# Patient Record
Sex: MALE | Race: WHITE | NOT HISPANIC OR LATINO | Employment: OTHER | URBAN - METROPOLITAN AREA
[De-identification: names, ages, dates, MRNs, and addresses within clinical notes are randomized per-mention and may not be internally consistent; named-entity substitution may affect disease eponyms.]

---

## 2017-04-10 ENCOUNTER — HOSPITAL ENCOUNTER (OUTPATIENT)
Dept: ULTRASOUND IMAGING | Facility: HOSPITAL | Age: 82
Discharge: HOME | End: 2017-04-10
Attending: FAMILY MEDICINE | Admitting: FAMILY MEDICINE

## 2018-11-15 ENCOUNTER — CONVERSION ENCOUNTER (OUTPATIENT)
Dept: CARDIOLOGY | Facility: CLINIC | Age: 83
End: 2018-11-15

## 2018-12-10 ENCOUNTER — CONVERSION ENCOUNTER (OUTPATIENT)
Dept: CARDIOLOGY | Facility: CLINIC | Age: 83
End: 2018-12-10

## 2019-01-01 ENCOUNTER — READMISSION MANAGEMENT (OUTPATIENT)
Dept: CALL CENTER | Facility: HOSPITAL | Age: 84
End: 2019-01-01

## 2019-01-01 ENCOUNTER — APPOINTMENT (OUTPATIENT)
Dept: CT IMAGING | Facility: HOSPITAL | Age: 84
End: 2019-01-01

## 2019-01-01 ENCOUNTER — OFFICE VISIT (OUTPATIENT)
Dept: CARDIOLOGY | Facility: CLINIC | Age: 84
End: 2019-01-01

## 2019-01-01 ENCOUNTER — APPOINTMENT (OUTPATIENT)
Dept: GENERAL RADIOLOGY | Facility: HOSPITAL | Age: 84
End: 2019-01-01

## 2019-01-01 ENCOUNTER — HOSPITAL ENCOUNTER (INPATIENT)
Facility: HOSPITAL | Age: 84
LOS: 3 days | Discharge: HOSPICE | End: 2019-10-25
Attending: EMERGENCY MEDICINE | Admitting: FAMILY MEDICINE

## 2019-01-01 ENCOUNTER — APPOINTMENT (OUTPATIENT)
Dept: MRI IMAGING | Facility: HOSPITAL | Age: 84
End: 2019-01-01

## 2019-01-01 VITALS
HEART RATE: 79 BPM | WEIGHT: 277.75 LBS | WEIGHT: 279 LBS | DIASTOLIC BLOOD PRESSURE: 92 MMHG | SYSTOLIC BLOOD PRESSURE: 142 MMHG | SYSTOLIC BLOOD PRESSURE: 152 MMHG | OXYGEN SATURATION: 95 % | DIASTOLIC BLOOD PRESSURE: 89 MMHG | HEART RATE: 65 BPM | OXYGEN SATURATION: 95 %

## 2019-01-01 VITALS
DIASTOLIC BLOOD PRESSURE: 72 MMHG | RESPIRATION RATE: 20 BRPM | HEART RATE: 63 BPM | BODY MASS INDEX: 34.24 KG/M2 | OXYGEN SATURATION: 95 % | WEIGHT: 275.35 LBS | TEMPERATURE: 97.6 F | SYSTOLIC BLOOD PRESSURE: 130 MMHG | HEIGHT: 75 IN

## 2019-01-01 VITALS
OXYGEN SATURATION: 95 % | SYSTOLIC BLOOD PRESSURE: 145 MMHG | DIASTOLIC BLOOD PRESSURE: 83 MMHG | BODY MASS INDEX: 35.42 KG/M2 | WEIGHT: 276 LBS | HEART RATE: 79 BPM | HEIGHT: 74 IN

## 2019-01-01 DIAGNOSIS — G93.5 NEOPLASM OF BRAIN CAUSING MASS EFFECT ON ADJACENT STRUCTURES (HCC): ICD-10-CM

## 2019-01-01 DIAGNOSIS — R41.3 MEMORY LOSS DUE TO MEDICAL CONDITION: Primary | ICD-10-CM

## 2019-01-01 DIAGNOSIS — D49.6 NEOPLASM OF BRAIN CAUSING MASS EFFECT ON ADJACENT STRUCTURES (HCC): ICD-10-CM

## 2019-01-01 DIAGNOSIS — R42 DIZZINESS: Primary | ICD-10-CM

## 2019-01-01 DIAGNOSIS — R55 NEAR SYNCOPE: ICD-10-CM

## 2019-01-01 LAB
ABO GROUP BLD: NORMAL
ALBUMIN SERPL-MCNC: 4.1 G/DL (ref 3.5–5.2)
ALBUMIN/GLOB SERPL: 2 G/DL
ALP SERPL-CCNC: 58 U/L (ref 39–117)
ALT SERPL W P-5'-P-CCNC: 17 U/L (ref 1–41)
ANION GAP SERPL CALCULATED.3IONS-SCNC: 11 MMOL/L (ref 5–15)
ANION GAP SERPL CALCULATED.3IONS-SCNC: 13 MMOL/L (ref 5–15)
ANION GAP SERPL CALCULATED.3IONS-SCNC: 13 MMOL/L (ref 5–15)
APTT PPP: 20.9 SECONDS (ref 24–31)
AST SERPL-CCNC: 17 U/L (ref 1–40)
BACTERIA SPEC AEROBE CULT: NORMAL
BACTERIA SPEC AEROBE CULT: NORMAL
BASOPHILS # BLD AUTO: 0 10*3/MM3 (ref 0–0.2)
BASOPHILS # BLD AUTO: 0.1 10*3/MM3 (ref 0–0.2)
BASOPHILS NFR BLD AUTO: 0 % (ref 0–1.5)
BASOPHILS NFR BLD AUTO: 0.1 % (ref 0–1.5)
BASOPHILS NFR BLD AUTO: 0.2 % (ref 0–1.5)
BASOPHILS NFR BLD AUTO: 1 % (ref 0–1.5)
BILIRUB SERPL-MCNC: 0.4 MG/DL (ref 0.2–1.2)
BILIRUB UR QL STRIP: NEGATIVE
BLD GP AB SCN SERPL QL: NEGATIVE
BUN BLD-MCNC: 15 MG/DL (ref 8–23)
BUN BLD-MCNC: 19 MG/DL (ref 8–23)
BUN BLD-MCNC: 21 MG/DL (ref 8–23)
BUN/CREAT SERPL: 20.8 (ref 7–25)
BUN/CREAT SERPL: 26.4 (ref 7–25)
BUN/CREAT SERPL: 28.4 (ref 7–25)
CALCIUM SPEC-SCNC: 8.7 MG/DL (ref 8.6–10.5)
CALCIUM SPEC-SCNC: 9.4 MG/DL (ref 8.6–10.5)
CALCIUM SPEC-SCNC: 9.6 MG/DL (ref 8.6–10.5)
CHLORIDE SERPL-SCNC: 104 MMOL/L (ref 98–107)
CHLORIDE SERPL-SCNC: 105 MMOL/L (ref 98–107)
CHLORIDE SERPL-SCNC: 107 MMOL/L (ref 98–107)
CHOLEST SERPL-MCNC: 81 MG/DL (ref 0–200)
CLARITY UR: CLEAR
CO2 SERPL-SCNC: 24 MMOL/L (ref 22–29)
CO2 SERPL-SCNC: 25 MMOL/L (ref 22–29)
CO2 SERPL-SCNC: 27 MMOL/L (ref 22–29)
COLOR UR: YELLOW
CREAT BLD-MCNC: 0.72 MG/DL (ref 0.76–1.27)
CREAT BLD-MCNC: 0.72 MG/DL (ref 0.76–1.27)
CREAT BLD-MCNC: 0.74 MG/DL (ref 0.76–1.27)
D-LACTATE SERPL-SCNC: 3.3 MMOL/L (ref 0.5–2)
D-LACTATE SERPL-SCNC: 3.8 MMOL/L (ref 0.5–2)
DEPRECATED RDW RBC AUTO: 45.9 FL (ref 37–54)
DEPRECATED RDW RBC AUTO: 45.9 FL (ref 37–54)
DEPRECATED RDW RBC AUTO: 46.4 FL (ref 37–54)
DEPRECATED RDW RBC AUTO: 46.4 FL (ref 37–54)
EOSINOPHIL # BLD AUTO: 0 10*3/MM3 (ref 0–0.4)
EOSINOPHIL # BLD AUTO: 0.2 10*3/MM3 (ref 0–0.4)
EOSINOPHIL NFR BLD AUTO: 0 % (ref 0.3–6.2)
EOSINOPHIL NFR BLD AUTO: 0 % (ref 0.3–6.2)
EOSINOPHIL NFR BLD AUTO: 0.1 % (ref 0.3–6.2)
EOSINOPHIL NFR BLD AUTO: 1.9 % (ref 0.3–6.2)
ERYTHROCYTE [DISTWIDTH] IN BLOOD BY AUTOMATED COUNT: 13.5 % (ref 12.3–15.4)
ERYTHROCYTE [DISTWIDTH] IN BLOOD BY AUTOMATED COUNT: 13.6 % (ref 12.3–15.4)
ERYTHROCYTE [DISTWIDTH] IN BLOOD BY AUTOMATED COUNT: 13.7 % (ref 12.3–15.4)
ERYTHROCYTE [DISTWIDTH] IN BLOOD BY AUTOMATED COUNT: 13.7 % (ref 12.3–15.4)
GFR SERPL CREATININE-BSD FRML MDRD: 101 ML/MIN/1.73
GFR SERPL CREATININE-BSD FRML MDRD: 104 ML/MIN/1.73
GFR SERPL CREATININE-BSD FRML MDRD: 104 ML/MIN/1.73
GLOBULIN UR ELPH-MCNC: 2.1 GM/DL
GLUCOSE BLD-MCNC: 136 MG/DL (ref 65–99)
GLUCOSE BLD-MCNC: 155 MG/DL (ref 65–99)
GLUCOSE BLD-MCNC: 159 MG/DL (ref 65–99)
GLUCOSE BLDC GLUCOMTR-MCNC: 111 MG/DL (ref 70–105)
GLUCOSE BLDC GLUCOMTR-MCNC: 124 MG/DL (ref 70–105)
GLUCOSE BLDC GLUCOMTR-MCNC: 132 MG/DL (ref 70–105)
GLUCOSE BLDC GLUCOMTR-MCNC: 135 MG/DL (ref 70–105)
GLUCOSE BLDC GLUCOMTR-MCNC: 136 MG/DL (ref 70–105)
GLUCOSE BLDC GLUCOMTR-MCNC: 137 MG/DL (ref 70–105)
GLUCOSE BLDC GLUCOMTR-MCNC: 138 MG/DL (ref 70–105)
GLUCOSE BLDC GLUCOMTR-MCNC: 150 MG/DL (ref 70–105)
GLUCOSE BLDC GLUCOMTR-MCNC: 153 MG/DL (ref 70–105)
GLUCOSE BLDC GLUCOMTR-MCNC: 154 MG/DL (ref 70–105)
GLUCOSE BLDC GLUCOMTR-MCNC: 188 MG/DL (ref 70–105)
GLUCOSE BLDC GLUCOMTR-MCNC: 191 MG/DL (ref 70–105)
GLUCOSE UR STRIP-MCNC: NEGATIVE MG/DL
HBA1C MFR BLD: 5.3 % (ref 3.5–5.6)
HCT VFR BLD AUTO: 39.5 % (ref 37.5–51)
HCT VFR BLD AUTO: 40.4 % (ref 37.5–51)
HCT VFR BLD AUTO: 41.8 % (ref 37.5–51)
HCT VFR BLD AUTO: 42.9 % (ref 37.5–51)
HDLC SERPL-MCNC: 24 MG/DL (ref 40–60)
HGB BLD-MCNC: 13.3 G/DL (ref 13–17.7)
HGB BLD-MCNC: 14 G/DL (ref 13–17.7)
HGB BLD-MCNC: 14.1 G/DL (ref 13–17.7)
HGB BLD-MCNC: 14.7 G/DL (ref 13–17.7)
HGB UR QL STRIP.AUTO: NEGATIVE
HOLD SPECIMEN: NORMAL
INR PPP: 1.02 (ref 0.9–1.1)
KETONES UR QL STRIP: ABNORMAL
LDLC SERPL CALC-MCNC: 25 MG/DL (ref 0–100)
LDLC/HDLC SERPL: 1.06 {RATIO}
LEUKOCYTE ESTERASE UR QL STRIP.AUTO: NEGATIVE
LYMPHOCYTES # BLD AUTO: 1 10*3/MM3 (ref 0.7–3.1)
LYMPHOCYTES # BLD AUTO: 1.1 10*3/MM3 (ref 0.7–3.1)
LYMPHOCYTES # BLD AUTO: 1.1 10*3/MM3 (ref 0.7–3.1)
LYMPHOCYTES # BLD AUTO: 1.7 10*3/MM3 (ref 0.7–3.1)
LYMPHOCYTES NFR BLD AUTO: 14.1 % (ref 19.6–45.3)
LYMPHOCYTES NFR BLD AUTO: 21.8 % (ref 19.6–45.3)
LYMPHOCYTES NFR BLD AUTO: 7.1 % (ref 19.6–45.3)
LYMPHOCYTES NFR BLD AUTO: 7.3 % (ref 19.6–45.3)
MAGNESIUM SERPL-MCNC: 1.9 MG/DL (ref 1.6–2.4)
MAGNESIUM SERPL-MCNC: 1.9 MG/DL (ref 1.6–2.4)
MAGNESIUM SERPL-MCNC: 2.1 MG/DL (ref 1.6–2.4)
MCH RBC QN AUTO: 32.6 PG (ref 26.6–33)
MCH RBC QN AUTO: 32.7 PG (ref 26.6–33)
MCH RBC QN AUTO: 33.1 PG (ref 26.6–33)
MCH RBC QN AUTO: 33.4 PG (ref 26.6–33)
MCHC RBC AUTO-ENTMCNC: 33.7 G/DL (ref 31.5–35.7)
MCHC RBC AUTO-ENTMCNC: 33.8 G/DL (ref 31.5–35.7)
MCHC RBC AUTO-ENTMCNC: 34.2 G/DL (ref 31.5–35.7)
MCHC RBC AUTO-ENTMCNC: 34.7 G/DL (ref 31.5–35.7)
MCV RBC AUTO: 96.2 FL (ref 79–97)
MCV RBC AUTO: 96.9 FL (ref 79–97)
MCV RBC AUTO: 97 FL (ref 79–97)
MCV RBC AUTO: 97 FL (ref 79–97)
MONOCYTES # BLD AUTO: 0.1 10*3/MM3 (ref 0.1–0.9)
MONOCYTES # BLD AUTO: 0.4 10*3/MM3 (ref 0.1–0.9)
MONOCYTES # BLD AUTO: 0.5 10*3/MM3 (ref 0.1–0.9)
MONOCYTES # BLD AUTO: 0.6 10*3/MM3 (ref 0.1–0.9)
MONOCYTES NFR BLD AUTO: 1 % (ref 5–12)
MONOCYTES NFR BLD AUTO: 2.6 % (ref 5–12)
MONOCYTES NFR BLD AUTO: 3.1 % (ref 5–12)
MONOCYTES NFR BLD AUTO: 7.9 % (ref 5–12)
MRSA SPEC QL CULT: NORMAL
NEUTROPHILS # BLD AUTO: 12.8 10*3/MM3 (ref 1.7–7)
NEUTROPHILS # BLD AUTO: 13.8 10*3/MM3 (ref 1.7–7)
NEUTROPHILS # BLD AUTO: 5.4 10*3/MM3 (ref 1.7–7)
NEUTROPHILS # BLD AUTO: 6.3 10*3/MM3 (ref 1.7–7)
NEUTROPHILS NFR BLD AUTO: 67.4 % (ref 42.7–76)
NEUTROPHILS NFR BLD AUTO: 84.6 % (ref 42.7–76)
NEUTROPHILS NFR BLD AUTO: 89.7 % (ref 42.7–76)
NEUTROPHILS NFR BLD AUTO: 90.1 % (ref 42.7–76)
NITRITE UR QL STRIP: NEGATIVE
NRBC BLD AUTO-RTO: 0 /100 WBC (ref 0–0.2)
NRBC BLD AUTO-RTO: 0 /100 WBC (ref 0–0.2)
NRBC BLD AUTO-RTO: 0.1 /100 WBC (ref 0–0.2)
NRBC BLD AUTO-RTO: 0.1 /100 WBC (ref 0–0.2)
PH UR STRIP.AUTO: 6 [PH] (ref 5–8)
PLATELET # BLD AUTO: 187 10*3/MM3 (ref 140–450)
PLATELET # BLD AUTO: 210 10*3/MM3 (ref 140–450)
PLATELET # BLD AUTO: 212 10*3/MM3 (ref 140–450)
PLATELET # BLD AUTO: 215 10*3/MM3 (ref 140–450)
PMV BLD AUTO: 8.6 FL (ref 6–12)
PMV BLD AUTO: 8.7 FL (ref 6–12)
PMV BLD AUTO: 9.5 FL (ref 6–12)
PMV BLD AUTO: 9.9 FL (ref 6–12)
POTASSIUM BLD-SCNC: 3.8 MMOL/L (ref 3.5–5.2)
POTASSIUM BLD-SCNC: 4 MMOL/L (ref 3.5–5.2)
POTASSIUM BLD-SCNC: 4.1 MMOL/L (ref 3.5–5.2)
PROT SERPL-MCNC: 6.2 G/DL (ref 6–8.5)
PROT UR QL STRIP: NEGATIVE
PROTHROMBIN TIME: 10.6 SECONDS (ref 9.6–11.7)
RBC # BLD AUTO: 4.08 10*6/MM3 (ref 4.14–5.8)
RBC # BLD AUTO: 4.2 10*6/MM3 (ref 4.14–5.8)
RBC # BLD AUTO: 4.3 10*6/MM3 (ref 4.14–5.8)
RBC # BLD AUTO: 4.43 10*6/MM3 (ref 4.14–5.8)
RH BLD: POSITIVE
SODIUM BLD-SCNC: 141 MMOL/L (ref 136–145)
SODIUM BLD-SCNC: 143 MMOL/L (ref 136–145)
SODIUM BLD-SCNC: 145 MMOL/L (ref 136–145)
SP GR UR STRIP: 1.03 (ref 1–1.03)
T&S EXPIRATION DATE: NORMAL
TRIGL SERPL-MCNC: 158 MG/DL (ref 0–150)
TROPONIN T SERPL-MCNC: <0.01 NG/ML (ref 0–0.03)
TSH SERPL DL<=0.05 MIU/L-ACNC: 3.12 UIU/ML (ref 0.27–4.2)
UROBILINOGEN UR QL STRIP: ABNORMAL
VIT B12 BLD-MCNC: 180 PG/ML (ref 211–946)
VLDLC SERPL-MCNC: 31.6 MG/DL
WBC NRBC COR # BLD: 14.2 10*3/MM3 (ref 3.4–10.8)
WBC NRBC COR # BLD: 15.4 10*3/MM3 (ref 3.4–10.8)
WBC NRBC COR # BLD: 7.5 10*3/MM3 (ref 3.4–10.8)
WBC NRBC COR # BLD: 8 10*3/MM3 (ref 3.4–10.8)
WHOLE BLOOD HOLD SPECIMEN: NORMAL
WHOLE BLOOD HOLD SPECIMEN: NORMAL

## 2019-01-01 PROCEDURE — 84484 ASSAY OF TROPONIN QUANT: CPT | Performed by: NURSE PRACTITIONER

## 2019-01-01 PROCEDURE — 83735 ASSAY OF MAGNESIUM: CPT | Performed by: NURSE PRACTITIONER

## 2019-01-01 PROCEDURE — 85730 THROMBOPLASTIN TIME PARTIAL: CPT | Performed by: EMERGENCY MEDICINE

## 2019-01-01 PROCEDURE — 25010000002 GADOTERIDOL PER 1 ML: Performed by: INTERNAL MEDICINE

## 2019-01-01 PROCEDURE — 87040 BLOOD CULTURE FOR BACTERIA: CPT | Performed by: NURSE PRACTITIONER

## 2019-01-01 PROCEDURE — 93005 ELECTROCARDIOGRAM TRACING: CPT | Performed by: EMERGENCY MEDICINE

## 2019-01-01 PROCEDURE — 99213 OFFICE O/P EST LOW 20 MIN: CPT | Performed by: INTERNAL MEDICINE

## 2019-01-01 PROCEDURE — 0 IOPAMIDOL PER 1 ML: Performed by: INTERNAL MEDICINE

## 2019-01-01 PROCEDURE — 83036 HEMOGLOBIN GLYCOSYLATED A1C: CPT | Performed by: NURSE PRACTITIONER

## 2019-01-01 PROCEDURE — 85025 COMPLETE CBC W/AUTO DIFF WBC: CPT | Performed by: EMERGENCY MEDICINE

## 2019-01-01 PROCEDURE — 82607 VITAMIN B-12: CPT | Performed by: NURSE PRACTITIONER

## 2019-01-01 PROCEDURE — 82962 GLUCOSE BLOOD TEST: CPT

## 2019-01-01 PROCEDURE — 63710000001 INSULIN LISPRO (HUMAN) PER 5 UNITS: Performed by: NURSE PRACTITIONER

## 2019-01-01 PROCEDURE — 85610 PROTHROMBIN TIME: CPT | Performed by: EMERGENCY MEDICINE

## 2019-01-01 PROCEDURE — 86901 BLOOD TYPING SEROLOGIC RH(D): CPT | Performed by: EMERGENCY MEDICINE

## 2019-01-01 PROCEDURE — 84443 ASSAY THYROID STIM HORMONE: CPT | Performed by: NURSE PRACTITIONER

## 2019-01-01 PROCEDURE — 71260 CT THORAX DX C+: CPT

## 2019-01-01 PROCEDURE — 80048 BASIC METABOLIC PNL TOTAL CA: CPT | Performed by: NURSE PRACTITIONER

## 2019-01-01 PROCEDURE — 86850 RBC ANTIBODY SCREEN: CPT | Performed by: EMERGENCY MEDICINE

## 2019-01-01 PROCEDURE — 93000 ELECTROCARDIOGRAM COMPLETE: CPT | Performed by: INTERNAL MEDICINE

## 2019-01-01 PROCEDURE — 25010000002 LEVETIRACETAM IN NACL 0.82% 500 MG/100ML SOLUTION: Performed by: NURSE PRACTITIONER

## 2019-01-01 PROCEDURE — 71045 X-RAY EXAM CHEST 1 VIEW: CPT

## 2019-01-01 PROCEDURE — 85025 COMPLETE CBC W/AUTO DIFF WBC: CPT | Performed by: NURSE PRACTITIONER

## 2019-01-01 PROCEDURE — 70450 CT HEAD/BRAIN W/O DYE: CPT

## 2019-01-01 PROCEDURE — 99231 SBSQ HOSP IP/OBS SF/LOW 25: CPT | Performed by: INTERNAL MEDICINE

## 2019-01-01 PROCEDURE — 25010000003 LEVETIRACETAM IN NACL 0.75% 1000 MG/100ML SOLUTION: Performed by: EMERGENCY MEDICINE

## 2019-01-01 PROCEDURE — 81003 URINALYSIS AUTO W/O SCOPE: CPT | Performed by: PHYSICIAN ASSISTANT

## 2019-01-01 PROCEDURE — 80053 COMPREHEN METABOLIC PANEL: CPT | Performed by: EMERGENCY MEDICINE

## 2019-01-01 PROCEDURE — 80061 LIPID PANEL: CPT | Performed by: NURSE PRACTITIONER

## 2019-01-01 PROCEDURE — 99231 SBSQ HOSP IP/OBS SF/LOW 25: CPT | Performed by: PHYSICIAN ASSISTANT

## 2019-01-01 PROCEDURE — 86900 BLOOD TYPING SEROLOGIC ABO: CPT | Performed by: EMERGENCY MEDICINE

## 2019-01-01 PROCEDURE — 99221 1ST HOSP IP/OBS SF/LOW 40: CPT | Performed by: PHYSICIAN ASSISTANT

## 2019-01-01 PROCEDURE — 25010000002 DEXAMETHASONE SODIUM PHOSPHATE 10 MG/ML SOLUTION: Performed by: NURSE PRACTITIONER

## 2019-01-01 PROCEDURE — 99232 SBSQ HOSP IP/OBS MODERATE 35: CPT | Performed by: NURSE PRACTITIONER

## 2019-01-01 PROCEDURE — 99222 1ST HOSP IP/OBS MODERATE 55: CPT | Performed by: INTERNAL MEDICINE

## 2019-01-01 PROCEDURE — 84484 ASSAY OF TROPONIN QUANT: CPT | Performed by: EMERGENCY MEDICINE

## 2019-01-01 PROCEDURE — 99284 EMERGENCY DEPT VISIT MOD MDM: CPT

## 2019-01-01 PROCEDURE — 83605 ASSAY OF LACTIC ACID: CPT | Performed by: NURSE PRACTITIONER

## 2019-01-01 PROCEDURE — 99232 SBSQ HOSP IP/OBS MODERATE 35: CPT | Performed by: INTERNAL MEDICINE

## 2019-01-01 PROCEDURE — 25010000002 DEXAMETHASONE SODIUM PHOSPHATE 20 MG/5ML SOLUTION 5 ML VIAL: Performed by: EMERGENCY MEDICINE

## 2019-01-01 PROCEDURE — 87081 CULTURE SCREEN ONLY: CPT | Performed by: NURSE PRACTITIONER

## 2019-01-01 PROCEDURE — A9576 INJ PROHANCE MULTIPACK: HCPCS | Performed by: INTERNAL MEDICINE

## 2019-01-01 PROCEDURE — 99233 SBSQ HOSP IP/OBS HIGH 50: CPT | Performed by: NURSE PRACTITIONER

## 2019-01-01 PROCEDURE — 86900 BLOOD TYPING SEROLOGIC ABO: CPT

## 2019-01-01 PROCEDURE — 86901 BLOOD TYPING SEROLOGIC RH(D): CPT

## 2019-01-01 PROCEDURE — 70553 MRI BRAIN STEM W/O & W/DYE: CPT

## 2019-01-01 PROCEDURE — 74177 CT ABD & PELVIS W/CONTRAST: CPT

## 2019-01-01 PROCEDURE — 99222 1ST HOSP IP/OBS MODERATE 55: CPT | Performed by: NURSE PRACTITIONER

## 2019-01-01 RX ORDER — TERBINAFINE HYDROCHLORIDE 250 MG/1
TABLET ORAL
COMMUNITY
Start: 2019-01-01 | End: 2019-01-01

## 2019-01-01 RX ORDER — NICOTINE POLACRILEX 4 MG
15 LOZENGE BUCCAL
Status: DISCONTINUED | OUTPATIENT
Start: 2019-01-01 | End: 2019-01-01 | Stop reason: HOSPADM

## 2019-01-01 RX ORDER — PRAVASTATIN SODIUM 20 MG
20 TABLET ORAL DAILY
COMMUNITY
Start: 2019-01-01 | End: 2019-01-01 | Stop reason: HOSPADM

## 2019-01-01 RX ORDER — SODIUM CHLORIDE 0.9 % (FLUSH) 0.9 %
10 SYRINGE (ML) INJECTION EVERY 12 HOURS SCHEDULED
Status: DISCONTINUED | OUTPATIENT
Start: 2019-01-01 | End: 2019-01-01 | Stop reason: HOSPADM

## 2019-01-01 RX ORDER — SODIUM CHLORIDE 0.9 % (FLUSH) 0.9 %
10 SYRINGE (ML) INJECTION AS NEEDED
Status: DISCONTINUED | OUTPATIENT
Start: 2019-01-01 | End: 2019-01-01 | Stop reason: HOSPADM

## 2019-01-01 RX ORDER — FAMOTIDINE 10 MG/ML
20 INJECTION, SOLUTION INTRAVENOUS EVERY 12 HOURS SCHEDULED
Status: DISCONTINUED | OUTPATIENT
Start: 2019-01-01 | End: 2019-01-01

## 2019-01-01 RX ORDER — LEVETIRACETAM 5 MG/ML
500 INJECTION INTRAVASCULAR EVERY 12 HOURS
Status: DISCONTINUED | OUTPATIENT
Start: 2019-01-01 | End: 2019-01-01 | Stop reason: HOSPADM

## 2019-01-01 RX ORDER — DEXAMETHASONE SODIUM PHOSPHATE 10 MG/ML
8 INJECTION, SOLUTION INTRAMUSCULAR; INTRAVENOUS EVERY 12 HOURS
Status: DISCONTINUED | OUTPATIENT
Start: 2019-01-01 | End: 2019-01-01 | Stop reason: HOSPADM

## 2019-01-01 RX ORDER — ATENOLOL 25 MG/1
25 TABLET ORAL DAILY
COMMUNITY
Start: 2019-01-01 | End: 2019-01-01 | Stop reason: HOSPADM

## 2019-01-01 RX ORDER — ASPIRIN 81 MG/1
81 TABLET ORAL DAILY
COMMUNITY
Start: 2018-12-10 | End: 2019-01-01 | Stop reason: HOSPADM

## 2019-01-01 RX ORDER — SERTRALINE HYDROCHLORIDE 25 MG/1
25 TABLET, FILM COATED ORAL DAILY
COMMUNITY
Start: 2019-01-01 | End: 2019-01-01 | Stop reason: HOSPADM

## 2019-01-01 RX ORDER — DEXTROSE MONOHYDRATE 25 G/50ML
25 INJECTION, SOLUTION INTRAVENOUS
Status: DISCONTINUED | OUTPATIENT
Start: 2019-01-01 | End: 2019-01-01 | Stop reason: HOSPADM

## 2019-01-01 RX ORDER — DEXAMETHASONE 4 MG/1
4 TABLET ORAL 2 TIMES DAILY WITH MEALS
Qty: 60 TABLET | Refills: 2 | Status: SHIPPED | OUTPATIENT
Start: 2019-01-01

## 2019-01-01 RX ORDER — LEVETIRACETAM 500 MG/1
500 TABLET ORAL 2 TIMES DAILY
Qty: 60 TABLET | Refills: 2 | Status: SHIPPED | OUTPATIENT
Start: 2019-01-01

## 2019-01-01 RX ORDER — LEVETIRACETAM 10 MG/ML
1000 INJECTION INTRAVASCULAR ONCE
Status: COMPLETED | OUTPATIENT
Start: 2019-01-01 | End: 2019-01-01

## 2019-01-01 RX ADMIN — Medication 10 ML: at 21:35

## 2019-01-01 RX ADMIN — LEVETIRACETAM 1000 MG: 10 INJECTION INTRAVENOUS at 00:50

## 2019-01-01 RX ADMIN — LEVETIRACETAM 500 MG: 5 INJECTION INTRAVENOUS at 01:52

## 2019-01-01 RX ADMIN — FAMOTIDINE 20 MG: 10 INJECTION, SOLUTION INTRAVENOUS at 23:36

## 2019-01-01 RX ADMIN — DEXAMETHASONE SODIUM PHOSPHATE 8 MG: 10 INJECTION, SOLUTION INTRAMUSCULAR; INTRAVENOUS at 09:00

## 2019-01-01 RX ADMIN — Medication 10 ML: at 20:31

## 2019-01-01 RX ADMIN — DEXAMETHASONE SODIUM PHOSPHATE 8 MG: 10 INJECTION, SOLUTION INTRAMUSCULAR; INTRAVENOUS at 09:35

## 2019-01-01 RX ADMIN — LEVETIRACETAM 500 MG: 5 INJECTION INTRAVENOUS at 23:44

## 2019-01-01 RX ADMIN — GADOTERIDOL 20 ML: 279.3 INJECTION, SOLUTION INTRAVENOUS at 14:00

## 2019-01-01 RX ADMIN — FAMOTIDINE 20 MG: 10 INJECTION, SOLUTION INTRAVENOUS at 08:28

## 2019-01-01 RX ADMIN — DEXAMETHASONE SODIUM PHOSPHATE 10 MG: 4 INJECTION, SOLUTION INTRAMUSCULAR; INTRAVENOUS at 21:25

## 2019-01-01 RX ADMIN — FAMOTIDINE 20 MG: 10 INJECTION, SOLUTION INTRAVENOUS at 20:30

## 2019-01-01 RX ADMIN — LEVETIRACETAM 500 MG: 5 INJECTION INTRAVENOUS at 13:55

## 2019-01-01 RX ADMIN — DEXAMETHASONE SODIUM PHOSPHATE 8 MG: 10 INJECTION, SOLUTION INTRAMUSCULAR; INTRAVENOUS at 21:35

## 2019-01-01 RX ADMIN — IOPAMIDOL 100 ML: 755 INJECTION, SOLUTION INTRAVENOUS at 12:45

## 2019-01-01 RX ADMIN — DEXAMETHASONE SODIUM PHOSPHATE 8 MG: 10 INJECTION, SOLUTION INTRAMUSCULAR; INTRAVENOUS at 20:30

## 2019-01-01 RX ADMIN — LEVETIRACETAM 500 MG: 5 INJECTION INTRAVENOUS at 11:59

## 2019-01-01 RX ADMIN — INSULIN LISPRO 2 UNITS: 100 INJECTION, SOLUTION INTRAVENOUS; SUBCUTANEOUS at 20:29

## 2019-01-01 RX ADMIN — Medication 10 ML: at 09:01

## 2019-01-01 RX ADMIN — Medication 10 ML: at 08:28

## 2019-01-01 RX ADMIN — FAMOTIDINE 20 MG: 10 INJECTION, SOLUTION INTRAVENOUS at 09:00

## 2019-01-01 RX ADMIN — INSULIN LISPRO 2 UNITS: 100 INJECTION, SOLUTION INTRAVENOUS; SUBCUTANEOUS at 23:44

## 2019-01-01 RX ADMIN — Medication 10 ML: at 23:36

## 2019-07-11 PROBLEM — R55 NEAR SYNCOPE: Status: ACTIVE | Noted: 2018-11-15

## 2019-07-11 PROBLEM — R42 DIZZINESS: Status: ACTIVE | Noted: 2018-11-15

## 2019-07-11 NOTE — PROGRESS NOTES
Encounter Date:07/11/2019  Last seen 12/10/2018      Patient ID: Bruce Manzo is a 84 y.o. male.    Chief Complaint:  Follow-up dizziness near syncope      History of Present Illness    Since I have last seen, the patient has been without any chest discomfort ,shortness of breath, palpitations, dizziness or syncope.  Denies having any headache ,abdominal pain ,nausea, vomiting , diarrhea constipation, loss of weight or loss of appetite.  Denies having any excessive bruising ,hematuria or blood in the stool.    Review of all systems negative except as indicated    Assessment and Plan       ////////////////////////    Impression  ===========  -recent dizziness and near syncopal  improved with better control of blood pressure on atenolol.     -chest pain atypical. Improved. Normal coronary arteries and normal left ventricle function 03/30/2015    - history of possible old stroke -CT scan and MRI       -tortuous right common iliac artery     -history of prostate carcinoma.  Patient is undergoing radiation therapy.  ============    Plan  ==========    Dizziness and blood pressure has improved with atenolol  Continue atenolol.  Medications were reviewed and updated.   patient is not having any angina pectoris or congestive heart failure.  Followup in the office in 6 months.  //////////////////////////////             Diagnosis Plan   1. Dizziness  ECG 12 Lead   2. Near syncope  ECG 12 Lead   LAB RESULTS (LAST 7 DAYS)    CBC        BMP        CMP         BNP        TROPONIN        CoAg        Creatinine Clearance  CrCl cannot be calculated (Patient's most recent lab result is older than the maximum 30 days allowed.).    ABG        Radiology  No radiology results for the last day                The following portions of the patient's history were reviewed and updated as appropriate: allergies, current medications, past family history, past medical history, past social history, past surgical history and problem  list.    Review of Systems   Constitution: Negative for malaise/fatigue.   Cardiovascular: Negative for chest pain, leg swelling, palpitations and syncope.   Respiratory: Negative for shortness of breath.    Skin: Negative for rash.   Gastrointestinal: Negative for nausea and vomiting.   Neurological: Negative for dizziness, light-headedness and numbness.         Current Outpatient Medications:   •  aspirin (ASPIR-LOW) 81 MG EC tablet, Daily., Disp: , Rfl:   •  atenolol (TENORMIN) 25 MG tablet, , Disp: , Rfl:   •  pravastatin (PRAVACHOL) 20 MG tablet, , Disp: , Rfl:   •  sertraline (ZOLOFT) 25 MG tablet, , Disp: , Rfl:   •  terbinafine (lamiSIL) 250 MG tablet, , Disp: , Rfl:     No Known Allergies    Family History   Problem Relation Age of Onset   • Colon cancer Mother    • Stroke Father    • Lung cancer Brother        Past Surgical History:   Procedure Laterality Date   • CARDIAC CATHETERIZATION  03/30/2015   • SKIN CANCER EXCISION      Neck        Past Medical History:   Diagnosis Date   • No known drug allergy    • Prostate cancer (CMS/HCC)     Currently in radiation therapy        Family History   Problem Relation Age of Onset   • Colon cancer Mother    • Stroke Father    • Lung cancer Brother        Social History     Socioeconomic History   • Marital status:      Spouse name: Not on file   • Number of children: Not on file   • Years of education: Not on file   • Highest education level: Not on file   Tobacco Use   • Smoking status: Never Smoker   Substance and Sexual Activity   • Alcohol use: No     Frequency: Never   • Drug use: No           ECG 12 Lead  Date/Time: 7/11/2019 10:23 AM  Performed by: Glendy Melgar MD  Authorized by: Glendy Melgar MD   Comparison: compared with previous ECG   Comments: Normal sinus rhythm 77/min premature ventricular contraction normal axis normal intervals nonspecific ST-T wave changes.  No change from 12/10/2018              Objective:       Physical Exam    BP  "145/83 (BP Location: Left arm, Patient Position: Sitting, Cuff Size: Large Adult)   Pulse 79   Ht 188 cm (74\")   Wt 125 kg (276 lb)   SpO2 95%   BMI 35.44 kg/m²   The patient is alert, oriented and in no distress.    Vital signs as noted above.    Head and neck revealed no carotid bruits or jugular venous distension.  No thyromegaly or lymphadenopathy is present.    Lungs clear.  No wheezing.  Breath sounds are normal bilaterally.    Heart normal first and second heart sounds.  No murmur..  No pericardial rub is present.  No gallop is present.    Abdomen soft and nontender.  No organomegaly is present.    Extremities revealed good peripheral pulses without any pedal edema.    Skin warm and dry.    Musculoskeletal system is grossly normal.    CNS grossly normal.        "

## 2019-10-22 PROBLEM — R41.3 MEMORY LOSS DUE TO MEDICAL CONDITION: Status: ACTIVE | Noted: 2019-01-01

## 2019-10-23 PROBLEM — R41.82 ALTERED MENTAL STATE: Status: ACTIVE | Noted: 2019-01-01

## 2019-10-23 PROBLEM — G93.89 BRAIN MASS: Status: ACTIVE | Noted: 2019-01-01

## 2019-10-23 NOTE — CONSULTS
Referring Provider: Intensivist  Reason for Consultation: New brain mass    Patient Care Team:  Rohit Goodman MD as PCP - General  Rohit Goodman MD as PCP - Claims Attributed    Chief complaint -Confusion, per family    Subjective .     History of present illness:  Bruce Manzo is a 84 y.o. male who presented to the ED with the onset of short-term memory loss per family.  Family is not at bedside to discuss this morning but the patient currently denies any memory loss, pain, headache, visual deficit, hearing loss, urinary incontinence.  Patient only reports generalized weakness.    Per the patients medical record and ED provider notes, the patient went out to his car last night but did not drive anywhere.  Family reported that his long-term memory seem to be intact but he did not have any short-term memory.    Patient's past medical history is significant for prostate cancer for which she is currently in radiation treatment for.  Patient also had a stroke in the past and treated for hypertension.    Review of Systems  Review of Systems   Reason unable to perform ROS: Unreliable ROS due to patients cognitive status.       History  Past Medical History:   Diagnosis Date   • CVA (cerebral vascular accident) (CMS/HCC)    • Hypertension    • Prostate cancer (CMS/HCC)     Currently in radiation therapy      Past Surgical History:   Procedure Laterality Date   • CARDIAC CATHETERIZATION  03/30/2015   • SKIN CANCER EXCISION      Neck      Family History   Problem Relation Age of Onset   • Colon cancer Mother    • Stroke Father    • Lung cancer Brother      Social History     Tobacco Use   • Smoking status: Never Smoker   Substance Use Topics   • Alcohol use: No     Frequency: Never   • Drug use: No     Medications Prior to Admission   Medication Sig Dispense Refill Last Dose   • aspirin (ASPIR-LOW) 81 MG EC tablet Take 81 mg by mouth Daily.   Taking   • atenolol (TENORMIN) 25 MG tablet Take 25 mg by mouth Daily.    "Taking   • pravastatin (PRAVACHOL) 20 MG tablet Take 20 mg by mouth Daily.   Taking   • sertraline (ZOLOFT) 25 MG tablet Take 25 mg by mouth Daily.   Taking     Patient has no known allergies.    Scheduled Meds:  dexamethasone 8 mg Intravenous Q12H   famotidine 20 mg Intravenous Q12H   insulin lispro 0-7 Units Subcutaneous Q6H   levETIRAcetam 500 mg Intravenous Q12H   sodium chloride 10 mL Intravenous Q12H     Continuous Infusions:   PRN Meds:.dextrose  •  dextrose  •  glucagon (human recombinant)  •  influenza vaccine  •  sodium chloride  •  sodium chloride    Objective     Vital Signs   Vitals:    10/23/19 0340 10/23/19 0410 10/23/19 0440 10/23/19 0525   BP: 107/59 126/72 124/63 131/71   BP Location:       Patient Position:       Pulse: 70 77 78 79   Resp:       Temp:    98.4 °F (36.9 °C)   TempSrc:    Oral   SpO2: 93% 94% 93% 94%   Weight:       Height:           Physical Exam:     General Appearance:    Alert, cooperative, in no acute distress; the patient did smell of urine as I was examining him, he did not think that he was incontinent.   Head:    Normocephalic, without obvious abnormality, atraumatic   Eyes:            Lids and lashes normal, PERRLA   Ears:    Ears appear intact with no abnormalities noted   Neck:   No adenopathy, supple, trachea midline   Back:     Range of motion normal   Lungs:     Respirations regular and unlabored   Chest Wall:    No abnormalities observed   Abdomen:     No masses, soft, non-tender, non-distended, no guarding   Extremities:   Moves all extremities well while laying in bed   Skin:   No bleeding, bruising or rash         Neurological examination:  Higher Integrative  Function: Oriented to time and person.  Although the patient does recall what year and what day it is, when I asked what hospital he is in his response is simply \"hospital\".  When asked who the president is his response is \"[employer]\".  He does have a consistent response to these questions on my exam as reported " by the nurse this morning.  He answers no to every other question.    CN II: Pupils are equal, round, and reactive to light.   CN III IV VI: Extraocular movements are full without nystagmus.   CN VII: Facial movements are symmetric. No weakness.  CN XI: Sternocleidomastoid and trapezius are normal.  No weakness.  CN XII:   The tongue is midline.  No atrophy or fasciculations.  Reflexes: 2+ in the upper and lower extremities. Plantar responses are flexor.  Sensation: Normal to light touch, pinprick, vibration, temperature, and proprioception in arms and legs. Normal graphesthesia and no extinction on DSS.  Station and Gait: Normal gait and station.    Coordination: Finger to nose test shows no dysmetria.  Rapid alternating movements are normal.  Heel to shin normal.        Results Review:  Lab Results (last 24 hours)     Procedure Component Value Units Date/Time    TSH [[id number]] Collected:  10/22/19 2055    Specimen:  Blood from Arm, Right Updated:  10/23/19 1013    Lipid Panel [[id number]] Collected:  10/22/19 2055    Specimen:  Blood from Arm, Right Updated:  10/23/19 1013    CBC & Differential [[id number]] Collected:  10/23/19 0403    Specimen:  Blood Updated:  10/23/19 0441    Narrative:       The following orders were created for panel order CBC & Differential.  Procedure                               Abnormality         Status                     ---------                               -----------         ------                     CBC Auto Differential[[id number]]        Abnormal            Final result                 Please view results for these tests on the individual orders.    CBC Auto Differential [[id number]]  (Abnormal) Collected:  10/23/19 0403    Specimen:  Blood Updated:  10/23/19 0441     WBC 7.50 10*3/mm3      RBC 4.20 10*6/mm3      Hemoglobin 14.0 g/dL      Hematocrit 40.4 %      MCV 96.2 fL      MCH 33.4 pg      MCHC 34.7 g/dL      RDW 13.6 %      RDW-SD 45.9 fl      MPV 8.7 fL      Platelets 187  10*3/mm3      Neutrophil % 84.6 %      Lymphocyte % 14.1 %      Monocyte % 1.0 %      Eosinophil % 0.1 %      Basophil % 0.2 %      Neutrophils, Absolute 6.30 10*3/mm3      Lymphocytes, Absolute 1.10 10*3/mm3      Monocytes, Absolute 0.10 10*3/mm3      Eosinophils, Absolute 0.00 10*3/mm3      Basophils, Absolute 0.00 10*3/mm3      nRBC 0.1 /100 WBC     Magnesium [[id number]]  (Normal) Collected:  10/22/19 2055    Specimen:  Blood from Arm, Right Updated:  10/23/19 0314     Magnesium 1.9 mg/dL     MRSA Screen Culture - Swab, Nares [[id number]] Collected:  10/23/19 0111    Specimen:  Swab from Nares Updated:  10/23/19 0114    POC Glucose Once [[id number]]  (Abnormal) Collected:  10/22/19 2339    Specimen:  Blood Updated:  10/22/19 2340     Glucose 111 mg/dL      Comment: Serial Number: [id number]:  [id number]       [place] Draw [[id number]] Collected:  10/22/19 2013    Specimen:  Blood Updated:  10/22/19 2213    Narrative:       The following orders were created for panel order [place] Draw.  Procedure                               Abnormality         Status                     ---------                               -----------         ------                     Light Blue Top[[id number]]                                   Final result               Green Top (Gel)[[id number]]                                  Final result               Lavender Top[[id number]]                                     Final result               Gold Top - SST[[id number]]                                   Final result                 Please view results for these tests on the individual orders.    Light Blue Top [[id number]] Collected:  10/22/19 2013    Specimen:  Blood from Arm, Left Updated:  10/22/19 2213     Extra Tube hold for add-on     Comment: Auto resulted       Green Top (Gel) [[id number]] Collected:  10/22/19 2013    Specimen:  Blood from Arm, Left Updated:  10/22/19 2213     Extra Tube Hold for add-ons.     Comment: Auto resulted.        Lavender Top [[id number]] Collected:  10/22/19 2013    Specimen:  Blood from Arm, Left Updated:  10/22/19 2213     Extra Tube hold for add-on     Comment: Auto resulted       Gold Top - SST [[id number]] Collected:  10/22/19 2013    Specimen:  Blood from Arm, Left Updated:  10/22/19 2213     Extra Tube Hold for add-ons.     Comment: Auto resulted.       Comprehensive Metabolic Panel [[id number]]  (Abnormal) Collected:  10/22/19 2055    Specimen:  Blood from Arm, Right Updated:  10/22/19 2121     Glucose 155 mg/dL      BUN 15 mg/dL      Creatinine 0.72 mg/dL      Sodium 145 mmol/L      Potassium 4.0 mmol/L      Chloride 107 mmol/L      CO2 27.0 mmol/L      Calcium 8.7 mg/dL      Total Protein 6.2 g/dL      Albumin 4.10 g/dL      ALT (SGPT) 17 U/L      AST (SGOT) 17 U/L      Alkaline Phosphatase 58 U/L      Total Bilirubin 0.4 mg/dL      eGFR Non African Amer 104 mL/min/1.73      Globulin 2.1 gm/dL      A/G Ratio 2.0 g/dL      BUN/Creatinine Ratio 20.8     Anion Gap 11.0 mmol/L     Narrative:       GFR Normal >60  Chronic Kidney Disease <60  Kidney Failure <15    Protime-INR [[id number]]  (Normal) Collected:  10/22/19 2013    Specimen:  Blood from Arm, Left Updated:  10/22/19 2046     Protime 10.6 Seconds      INR 1.02    aPTT [[id number]]  (Abnormal) Collected:  10/22/19 2013    Specimen:  Blood from Arm, Left Updated:  10/22/19 2046     PTT 20.9 seconds     Troponin [[id number]]  (Normal) Collected:  10/22/19 2013    Specimen:  Blood from Arm, Left Updated:  10/22/19 2044     Troponin T <0.010 ng/mL     Narrative:       Troponin T Reference Range:  <= 0.03 ng/mL-   Negative for AMI  >0.03 ng/mL-     Abnormal for myocardial necrosis.  Clinicians would have to utilize clinical acumen, EKG, Troponin and serial changes to determine if it is an Acute Myocardial Infarction or myocardial injury due to an underlying chronic condition.     CBC & Differential [[id number]] Collected:  10/22/19 2013    Specimen:  Blood Updated:   10/22/19 2020    Narrative:       The following orders were created for panel order CBC & Differential.  Procedure                               Abnormality         Status                     ---------                               -----------         ------                     CBC Auto Differential[[id number]]        Abnormal            Final result                 Please view results for these tests on the individual orders.    CBC Auto Differential [[id number]]  (Abnormal) Collected:  10/22/19 2013    Specimen:  Blood from Arm, Left Updated:  10/22/19 2020     WBC 8.00 10*3/mm3      RBC 4.43 10*6/mm3      Hemoglobin 14.7 g/dL      Hematocrit 42.9 %      MCV 97.0 fL      MCH 33.1 pg      MCHC 34.2 g/dL      RDW 13.5 %      RDW-SD 45.9 fl      MPV 8.6 fL      Platelets 215 10*3/mm3      Neutrophil % 67.4 %      Lymphocyte % 21.8 %      Monocyte % 7.9 %      Eosinophil % 1.9 %      Basophil % 1.0 %      Neutrophils, Absolute 5.40 10*3/mm3      Lymphocytes, Absolute 1.70 10*3/mm3      Monocytes, Absolute 0.60 10*3/mm3      Eosinophils, Absolute 0.20 10*3/mm3      Basophils, Absolute 0.10 10*3/mm3      nRBC 0.0 /100 WBC     POC Glucose Once [[id number]]  (Abnormal) Collected:  10/22/19 2009    Specimen:  Blood Updated:  10/22/19 2010     Glucose 124 mg/dL      Comment: Serial Number: [id number]:  [id number]             Imaging Results (last 24 hours)     Procedure Component Value Units Date/Time    XR Chest 1 View [[id number]] Collected:  10/22/19 2101     Updated:  10/22/19 2105    Narrative:       DATE OF EXAM:  10/22/2019 8:54 PM     PROCEDURE:  XR CHEST 1 VW-     INDICATIONS:  Altered mental status, hypertension.      COMPARISON:  10/24/2018.     TECHNIQUE:   Single radiographic view of the chest was obtained.     FINDINGS:  The heart size is normal. The pulmonary vascular markings are normal.  There is stable minor scarring in the lung bases. The lungs and pleural  spaces are clear of active disease.  There are  mild chronic age-related  changes involving the bony thorax and thoracic aorta.       Impression:       No active disease.     Electronically Signed By-Rohit Villa On:10/22/2019 9:03 PM  This report was finalized on [id number] by  Rohit Villa, .    CT Head Without Contrast Stroke Protocol [[id number]] Collected:  10/22/19 2026     Updated:  10/22/19 2033    Narrative:          DATE OF EXAM:  10/22/2019 8:15 PM     PROCEDURE:   CT HEAD WO CONTRAST STROKE PROTOCOL-     INDICATIONS:  Cerebral vascular accident protocol, confusion, right-sided weakness,  abnormal gait.     COMPARISON:   MRI of the brain performed on 10/24/2018.     TECHNIQUE:  Routine transaxial cuts were obtained through the head without the  administration of contrast. Automated exposure control and iterative  reconstruction methods were used.     FINDINGS:  Study is markedly abnormal. There is a hyperdense mass located within  the frontal lobes which crosses midline through the corpus callosum.  There appear to be several additional hyperdense masses in the left  corona radiata. This is concerning for either a GBM versus CNS lymphoma.  There appears to be a large area of vasogenic edema in the left cerebral  hemisphere mainly involving the frontal lobe. There also may be volume  loss due to encephalomalacia. Mild vasogenic edema is seen in the right  frontal lobe. There is effacement of the left lateral ventricle. There  is approximately 7 mm of left-to-right subfalcine shift. The basal  cisterns and fourth ventricle are well-maintained and normal. There may  be an old lacunar infarct within the left aspect of the virgilio. The  orbital contents are normal. The paranasal and mastoid sinuses are  clear. The calvarium is normal.        Impression:       IMPRESSION :     1. There is a hyperdense mass located within the frontal lobes which  crosses midline through the corpus callosum. There appear to be  additional hyperdense masses in the left corona  radiata. This is  concerning for a GBM versus CNS lymphoma. I would recommend an MRI of  the brain with and without contrast for follow-up. The findings were  discussed with Dr. Yoseph Vincent M.D. by telephone.  2. There is vasogenic edema which is most marked in the left frontal  lobe. I cannot completely exclude encephalomalacia in the left frontal  lobe.  3. There is 7 mm a left to right subfalcine shift. There is no  transtentorial herniation.  4. Questionable old lacunar infarct in the left aspect of the virgilio.     Electronically Signed By-Rohit Villa On:10/22/2019 8:31 PM  This report was finalized on [id number] by  Rohit Villa, .            Assessment/Plan       Memory loss due to medical condition    Brain mass    Altered mental state      PLAN:   Mr. Bruce Manzo is an 84-year-old male with new onset memory loss and difficulty with word finding.  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      We do need to obtain an MRI of his brain both with and without contrast for further evaluation of this new identified mass.    Patient's family was not at bedside on my exam this morning but I will come back and discuss the MRI findings with them later today.  If this tumor does spread across the corpus callosum, this typically only occurs with aggressive tumors such as a glioblastoma or lymphoma.    Pt does have a history of prostate cancer, but metastatic disease to the brain from prostate cancer is rare without the context of widespread metastatic disease.     Last CT of the abd/pel in the patients chart is from October 2018, and there was no identifiable abnormality. We do also recommend and will order a CT of the chest/abd/pel to assess for any signs of a primary malignancy.     I will consult Oncology after we have the results of the MRI brain. We will likely recommend a biopsy, as lymphoma responds well to radiation, but will await  these results prior to discussing this with family.     Please hold palliative conversation with family until we have the MRI results and have discussed these with the family.     Continue dexamethasone and Keppra    I discussed the patients findings and recommendations with patient    Sarah Wheeler PA-C  10/23/19  10:21 AM

## 2019-10-23 NOTE — PROGRESS NOTES
Discharge Planning Assessment   Jose L     Patient Name: Bruce Manzo  MRN: [id number]  Today's Date: 10/23/2019    Admit Date: 10/22/2019    Discharge Needs Assessment     Row Name 10/23/19 0957       Living Environment    Lives With  spouse    Primary Care Provided by  [facility]    Quality of Family Relationships  supportive    Able to Return to Prior Arrangements  yes       Discharge Needs Assessment    Discharge Facility/Level of Care Needs  other (see comments) home with hospice     Discharge Coordination/Progress  Pt has new brain tumor . Palliative consulted .        Discharge Plan     Row Name 10/23/19 0959       Plan    Plan  Pallitive consult and Hospice consult     Plan Comments  Pt was brought into the ERfor new onset of confusion and a code stroke was called. CT scan showed a large brain tumor. Pt was started on IV Keppra and Decadron .        Destination      No service coordination in this encounter.      Durable Medical Equipment      No service coordination in this encounter.      Dialysis/Infusion      No service coordination in this encounter.      Home Medical Care      No service coordination in this encounter.      Therapy      No service coordination in this encounter.      Community Resources      No service coordination in this encounter.          Demographic Summary    No documentation.       Functional Status    No documentation.       Psychosocial    No documentation.       Abuse/Neglect    No documentation.       Legal    No documentation.       Substance Abuse    No documentation.       Patient Forms    No documentation.           Kailee Sarah RN

## 2019-10-23 NOTE — SIGNIFICANT NOTE
Full Code / Aggressive Care    Pt.'s family at bedside.  Palliative care  introduced team and offered support.  Advance directive education also given.  Pt and family report that they are waiting for more information regarding pt's diagnosis as well as possible treatment options.  Pt and family responded very little during our meeting and appeared to be quite nervous.  Emotional support provided.  See palliative NP note.  Will continue to follow.  Thank you for the consult.

## 2019-10-23 NOTE — CONSULTS
Palliative Care Consultation    Patient Code Status    Code Status and Medical Interventions:   Ordered at: 10/22/19 2209     Code Status:    CPR     Medical Interventions (Level of Support Prior to Arrest):    Full       Requesting clinician:  Consulting Physician(s)     Provider Relationship Specialty    Zeus Prajapati MD Consulting Physician Pulmonary Disease    Werner Koch MD Consulting Physician Neurology    Apollo Meek MD Consulting Physician Neurosurgery        Reason for consult: Consultation for clarification of goals of care and code status.      Chief Complaint    confusion    History of Present Illness    Bruce Manzo is a 84 y.o. male who presented to the ED on 10/22/19 because of worsening confusion. Per family at bedside the patient has been doing odd things over the previous 2 weeks, ex. Would not shave the sides of his face and wouldn't let his grand daughter shave him. Family found him in the car but didn't know where he was going. Questioned patient prior to family arrival and he is able to state that it is 2019 but repeats this answer when questioned regarding who the president is and how many children he has.  Wife and grand daughter arrived and updated on information. Wife has bilateral hearing aides and is able to read lips but per grand daughter. Wife states they have all the information they need and the ER doctor told them more than they could handle.  Aware that the patient will be undergoing an MRI and then will get information and recommendations from neurosurgery.  CT head shows;       CT Head Without Contrast Stroke Protocol [THC3024] (Order [id number])   Order   Status: Final result   Patient Location   Patient Class Location   [facility]patient [facility], [address]306, 1     [phone]      Study Notes      Cyrus Carmona on 10/22/2019  8:16 PM   Confusion/ RT side weakness/ change in PT gait/ hx stroke last year      Appointment Information   PACS  Images   Radiology Images   Study Result     DATE OF EXAM:  10/22/2019 8:15 PM  PROCEDURE:   CT HEAD WO CONTRAST STROKE PROTOCOL-  INDICATIONS:  Cerebral vascular accident protocol, confusion, right-sided weakness,  abnormal gait.  COMPARISON:   MRI of the brain performed on 10/24/2018.  TECHNIQUE:  Routine transaxial cuts were obtained through the head without the  administration of contrast. Automated exposure control and iterative  reconstruction methods were used.  FINDINGS:  Study is markedly abnormal. There is a hyperdense mass located within  the frontal lobes which crosses midline through the corpus callosum.  There appear to be several additional hyperdense masses in the left  corona radiata. This is concerning for either a GBM versus CNS lymphoma.  There appears to be a large area of vasogenic edema in the left cerebral  hemisphere mainly involving the frontal lobe. There also may be volume  loss due to encephalomalacia. Mild vasogenic edema is seen in the right  frontal lobe. There is effacement of the left lateral ventricle. There  is approximately 7 mm of left-to-right subfalcine shift. The basal  cisterns and fourth ventricle are well-maintained and normal. There may  be an old lacunar infarct within the left aspect of the virgilio. The  orbital contents are normal. The paranasal and mastoid sinuses are  clear. The calvarium is normal.   IMPRESSION:  IMPRESSION :  1. There is a hyperdense mass located within the frontal lobes which  crosses midline through the corpus callosum. There appear to be  additional hyperdense masses in the left corona radiata. This is  concerning for a GBM versus CNS lymphoma. I would recommend an MRI of  the brain with and without contrast for follow-up. The findings were  discussed with Dr. Yoseph Vincent M.D. by telephone.  2. There is vasogenic edema which is most marked in the left frontal  lobe. I cannot completely exclude encephalomalacia in the left frontal  lobe.  3. There  is 7 mm a left to right subfalcine shift. There is no  transtentorial herniation.  4. Questionable old lacunar infarct in the left aspect of the virgilio.  Electronically Signed By-Earle Inman On:10/22/2019 8:31 PM  This report was finalized on [id number] by  Earle Inman, .   Imaging   CT Head Without Contrast Stroke Protocol (Order: [id number]) - 10/22/2019   Reprint Order Requisition   CT Head Without Contrast Stroke Protocol (Order #[id number]) on 10/22/19   Signed by   Signed Date/Time  Phone Pager   EARLE INMAN 10/22/2019 20:31 [phone]      Prior to recent events spouse states the patient has been very active, history of prostate cancer but this was over 5 yrs ago.  Disease process education and emotional support provided.      Advanced Care Planning    Advanced Directives: Patient does not have advance directive  Health Care Directive on file: No  Health Care Surrogate:      Comments: no living will or poa    Assessment/Plan   Confusion x 2 weeks  ataxia  CT head with code stroke protocol shows mass in frontal lobe /midline shift and into corpus callosum.  History of prostate cancer over 5 yrs ago  Hypertension  Previous CVA    Active Problems:    Memory loss due to medical condition    Brain mass    Altered mental state      Plan    Family awaiting neurosurgery consult and results of MRI before making any further decisions. Very overwhelmed    Review of Systems   Unable to perform ROS: Mental status change         Past Medical History:   Diagnosis Date   • CVA (cerebral vascular accident) (CMS/HCC)    • Hypertension    • Prostate cancer (CMS/HCC)     Currently in radiation therapy      Past Surgical History:   Procedure Laterality Date   • CARDIAC CATHETERIZATION  03/30/2015   • SKIN CANCER EXCISION      Neck      Family History   Problem Relation Age of Onset   • Colon cancer Mother    • Stroke Father    • Lung cancer Brother      Social History     Tobacco Use   • Smoking status: Never Smoker   Substance  Use Topics   • Alcohol use: No     Frequency: Never   • Drug use: No     Medications Prior to Admission   Medication Sig Dispense Refill Last Dose   • aspirin (ASPIR-LOW) 81 MG EC tablet Take 81 mg by mouth Daily.   Taking   • atenolol (TENORMIN) 25 MG tablet Take 25 mg by mouth Daily.   Taking   • pravastatin (PRAVACHOL) 20 MG tablet Take 20 mg by mouth Daily.   Taking   • sertraline (ZOLOFT) 25 MG tablet Take 25 mg by mouth Daily.   Taking       Allergies  Patient has no known allergies.    Scheduled Meds:    dexamethasone 8 mg Intravenous Q12H   famotidine 20 mg Intravenous Q12H   insulin lispro 0-7 Units Subcutaneous Q6H   levETIRAcetam 500 mg Intravenous Q12H   sodium chloride 10 mL Intravenous Q12H     Continuous Infusions:     PRN Meds:  dextrose  •  dextrose  •  glucagon (human recombinant)  •  influenza vaccine  •  sodium chloride  •  sodium chloride    Lab Results (last 24 hours)     Procedure Component Value Units Date/Time    TSH [[id number]]  (Normal) Collected:  10/22/19 2055    Specimen:  Blood from Arm, Right Updated:  10/23/19 1037     TSH 3.120 uIU/mL     Hemoglobin A1c [[id number]] Collected:  10/23/19 0403    Specimen:  Blood Updated:  10/23/19 1036    Lipid Panel [[id number]]  (Abnormal) Collected:  10/22/19 2055    Specimen:  Blood from Arm, Right Updated:  10/23/19 1030     Total Cholesterol 81 mg/dL      Triglycerides 158 mg/dL      HDL Cholesterol 24 mg/dL      LDL Cholesterol  25 mg/dL      VLDL Cholesterol 31.6 mg/dL      LDL/HDL Ratio 1.06    Narrative:       Cholesterol Reference Ranges  (U.S. Department of Health and Human Services ATP III Classifications)    Desirable          <200 mg/dL  Borderline High    200-239 mg/dL  High Risk          >240 mg/dL      Triglyceride Reference Ranges  (U.S. Department of Health and Human Services ATP III Classifications)    Normal           <150 mg/dL  Borderline High  150-199 mg/dL  High             200-499 mg/dL  Very High        >500 mg/dL    HDL  Reference Ranges  (U.S. Department of Health and Human Services ATP III Classifcations)    Low     <40 mg/dl (major risk factor for CHD)  High    >60 mg/dl ('negative' risk factor for CHD)        LDL Reference Ranges  (U.S. Department of Health and Human Services ATP III Classifcations)    Optimal          <100 mg/dL  Near Optimal     100-129 mg/dL  Borderline High  130-159 mg/dL  High             160-189 mg/dL  Very High        >189 mg/dL    CBC & Differential [[id number]] Collected:  10/23/19 0403    Specimen:  Blood Updated:  10/23/19 0441    Narrative:       The following orders were created for panel order CBC & Differential.  Procedure                               Abnormality         Status                     ---------                               -----------         ------                     CBC Auto Differential[[id number]]        Abnormal            Final result                 Please view results for these tests on the individual orders.    CBC Auto Differential [[id number]]  (Abnormal) Collected:  10/23/19 0403    Specimen:  Blood Updated:  10/23/19 0441     WBC 7.50 10*3/mm3      RBC 4.20 10*6/mm3      Hemoglobin 14.0 g/dL      Hematocrit 40.4 %      MCV 96.2 fL      MCH 33.4 pg      MCHC 34.7 g/dL      RDW 13.6 %      RDW-SD 45.9 fl      MPV 8.7 fL      Platelets 187 10*3/mm3      Neutrophil % 84.6 %      Lymphocyte % 14.1 %      Monocyte % 1.0 %      Eosinophil % 0.1 %      Basophil % 0.2 %      Neutrophils, Absolute 6.30 10*3/mm3      Lymphocytes, Absolute 1.10 10*3/mm3      Monocytes, Absolute 0.10 10*3/mm3      Eosinophils, Absolute 0.00 10*3/mm3      Basophils, Absolute 0.00 10*3/mm3      nRBC 0.1 /100 WBC     Magnesium [[id number]]  (Normal) Collected:  10/22/19 2055    Specimen:  Blood from Arm, Right Updated:  10/23/19 0314     Magnesium 1.9 mg/dL     MRSA Screen Culture - Swab, Nares [[id number]] Collected:  10/23/19 0111    Specimen:  Swab from Nares Updated:  10/23/19 0114    POC Glucose Once  [[id number]]  (Abnormal) Collected:  10/22/19 2339    Specimen:  Blood Updated:  10/22/19 2340     Glucose 111 mg/dL      Comment: Serial Number: [id number]:  [id number]       [place] Draw [[id number]] Collected:  10/22/19 2013    Specimen:  Blood Updated:  10/22/19 2213    Narrative:       The following orders were created for panel order [place] Draw.  Procedure                               Abnormality         Status                     ---------                               -----------         ------                     Light Blue Top[[id number]]                                   Final result               Green Top (Gel)[[id number]]                                  Final result               Lavender Top[[id number]]                                     Final result               Gold Top - SST[[id number]]                                   Final result                 Please view results for these tests on the individual orders.    Light Blue Top [[id number]] Collected:  10/22/19 2013    Specimen:  Blood from Arm, Left Updated:  10/22/19 2213     Extra Tube hold for add-on     Comment: Auto resulted       Green Top (Gel) [[id number]] Collected:  10/22/19 2013    Specimen:  Blood from Arm, Left Updated:  10/22/19 2213     Extra Tube Hold for add-ons.     Comment: Auto resulted.       Lavender Top [[id number]] Collected:  10/22/19 2013    Specimen:  Blood from Arm, Left Updated:  10/22/19 2213     Extra Tube hold for add-on     Comment: Auto resulted       Gold Top - SST [[id number]] Collected:  10/22/19 2013    Specimen:  Blood from Arm, Left Updated:  10/22/19 2213     Extra Tube Hold for add-ons.     Comment: Auto resulted.       Comprehensive Metabolic Panel [[id number]]  (Abnormal) Collected:  10/22/19 2055    Specimen:  Blood from Arm, Right Updated:  10/22/19 2121     Glucose 155 mg/dL      BUN 15 mg/dL      Creatinine 0.72 mg/dL      Sodium 145 mmol/L      Potassium 4.0 mmol/L      Chloride 107 mmol/L      CO2  27.0 mmol/L      Calcium 8.7 mg/dL      Total Protein 6.2 g/dL      Albumin 4.10 g/dL      ALT (SGPT) 17 U/L      AST (SGOT) 17 U/L      Alkaline Phosphatase 58 U/L      Total Bilirubin 0.4 mg/dL      eGFR Non African Amer 104 mL/min/1.73      Globulin 2.1 gm/dL      A/G Ratio 2.0 g/dL      BUN/Creatinine Ratio 20.8     Anion Gap 11.0 mmol/L     Narrative:       GFR Normal >60  Chronic Kidney Disease <60  Kidney Failure <15    Protime-INR [[id number]]  (Normal) Collected:  10/22/19 2013    Specimen:  Blood from Arm, Left Updated:  10/22/19 2046     Protime 10.6 Seconds      INR 1.02    aPTT [[id number]]  (Abnormal) Collected:  10/22/19 2013    Specimen:  Blood from Arm, Left Updated:  10/22/19 2046     PTT 20.9 seconds     Troponin [[id number]]  (Normal) Collected:  10/22/19 2013    Specimen:  Blood from Arm, Left Updated:  10/22/19 2044     Troponin T <0.010 ng/mL     Narrative:       Troponin T Reference Range:  <= 0.03 ng/mL-   Negative for AMI  >0.03 ng/mL-     Abnormal for myocardial necrosis.  Clinicians would have to utilize clinical acumen, EKG, Troponin and serial changes to determine if it is an Acute Myocardial Infarction or myocardial injury due to an underlying chronic condition.     CBC & Differential [[id number]] Collected:  10/22/19 2013    Specimen:  Blood Updated:  10/22/19 2020    Narrative:       The following orders were created for panel order CBC & Differential.  Procedure                               Abnormality         Status                     ---------                               -----------         ------                     CBC Auto Differential[[id number]]        Abnormal            Final result                 Please view results for these tests on the individual orders.    CBC Auto Differential [[id number]]  (Abnormal) Collected:  10/22/19 2013    Specimen:  Blood from Arm, Left Updated:  10/22/19 2020     WBC 8.00 10*3/mm3      RBC 4.43 10*6/mm3      Hemoglobin 14.7 g/dL       "Hematocrit 42.9 %      MCV 97.0 fL      MCH 33.1 pg      MCHC 34.2 g/dL      RDW 13.5 %      RDW-SD 45.9 fl      MPV 8.6 fL      Platelets 215 10*3/mm3      Neutrophil % 67.4 %      Lymphocyte % 21.8 %      Monocyte % 7.9 %      Eosinophil % 1.9 %      Basophil % 1.0 %      Neutrophils, Absolute 5.40 10*3/mm3      Lymphocytes, Absolute 1.70 10*3/mm3      Monocytes, Absolute 0.60 10*3/mm3      Eosinophils, Absolute 0.20 10*3/mm3      Basophils, Absolute 0.10 10*3/mm3      nRBC 0.0 /100 WBC     POC Glucose Once [[id number]]  (Abnormal) Collected:  10/22/19 2009    Specimen:  Blood Updated:  10/22/19 2010     Glucose 124 mg/dL      Comment: Serial Number: [id number]:  [id number]                 Palliative Assessment     Vital Signs (last 24 hours)       10/22 0700  -  10/23 0659 10/23 0700  -  10/23 1044   Most Recent    Temp (°F) 97.4 -  98.4       98.4 (36.9)    Heart Rate 61 -  79       79    Resp   18       18    /61 -  136/70       131/71    SpO2 (%) 93 -  99       94        Physical Exam   Constitutional: He appears well-developed and well-nourished.   HENT:   Head: Normocephalic and atraumatic.   Nose: Nose normal.   Eyes: Pupils are equal, round, and reactive to light.   Neck: Normal range of motion.   Cardiovascular: Normal rate, regular rhythm and normal heart sounds.   Pulmonary/Chest: Effort normal and breath sounds normal.   Abdominal: Soft. Bowel sounds are normal.   Musculoskeletal: Normal range of motion.   Neurological: He is alert.   Able to answer a few questions correctly but is incorrect on most questions. Pleasantly confused.  States that he is aware he is at the hospital but when questioned which hospital he states \"[facility]\"  Asked about his history of prostate cancer and he acknowledges yes but unsure of treatment and states that was 6 months ago.  Wife states over 5 years.   Skin: Skin is warm and dry.   Psychiatric: He has a normal mood and affect.   Vitals reviewed.        Decisional " Capacity: no  Patient's understanding of illness: no  Patient goals of care:  Need further discussion after test results        Kaela Medina, APRN

## 2019-10-23 NOTE — ED PROVIDER NOTES
Subjective   84-year-old male with the onset of short-term memory loss this evening.  The patient had difficulty remembering what he had done for dinner.  The patient also apparently went out set in a car but did not try to go anywhere.  States they asked him several questions and realized his longer-term memory was intact but not short-term.  He has not complained of headache.  There is been no reports of recent fever chills or viral syndrome.  The patient reportedly had a stroke in the past.  He is on aspirin and clopidogrel.  The patient has had no neck pain or stiffness.  He denies chest pain or shortness of breath.  He denies nausea.  He reports no focal neurologic defects or lateralizing neurologic signs            Review of Systems   Unable to perform ROS: Mental status change       Past Medical History:   Diagnosis Date   • No known drug allergy    • Prostate cancer (CMS/HCC)     Currently in radiation therapy        No Known Allergies    Past Surgical History:   Procedure Laterality Date   • CARDIAC CATHETERIZATION  03/30/2015   • SKIN CANCER EXCISION      Neck        Family History   Problem Relation Age of Onset   • Colon cancer Mother    • Stroke Father    • Lung cancer Brother        Social History     Socioeconomic History   • Marital status:      Spouse name: Not on file   • Number of children: Not on file   • Years of education: Not on file   • Highest education level: Not on file   Tobacco Use   • Smoking status: Never Smoker   Substance and Sexual Activity   • Alcohol use: No     Frequency: Never   • Drug use: No           Objective   Physical Exam   Neurological: Coordination and gait abnormal. GCS eye subscore is 4. GCS verbal subscore is 4. GCS motor subscore is 6.       Procedures           ED Course  ED Course as of Oct 22 2200   Tue Oct 22, 2019   2136 Stable [place] Coma Scale of 14 with NIH of 5 in the emergency department.  Findings were discussed with the patient and family.  Case  had previously been discussed with the radiologist who recommended an MRI in a.m.  [TH]      ED Course User Index  [TH] Dany Vincent MD                  MDM  Number of Diagnoses or Management Options     Amount and/or Complexity of Data Reviewed  Clinical lab tests: reviewed  Tests in the radiology section of CPT®: reviewed  Tests in the medicine section of CPT®: reviewed  Review and summarize past medical records: yes  Discuss the patient with other providers: yes  Independent visualization of images, tracings, or specimens: yes    Risk of Complications, Morbidity, and/or Mortality  Presenting problems: high  Diagnostic procedures: high  Management options: high  General comments: Stable ER course.  Patient continued to have short-term memory issues.  The findings were discussed the family members as well as the intensivist practitioner.  The patient will be admitted he will need MR with and without.  Neurosurgery was also consulted.  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

## 2019-10-23 NOTE — CONSULTS
Hematology/Oncology Inpatient Consultation    Patient name: Bruce Manzo  : 1935  MRN: [id number]  Referring Provider: JACKIE Wheeler PA-C  Reason for Consultation: brain mass    Chief complaint: confusion    History of present illness:    84 y.o. male who was admitted thru the ER on 10/22/19 reporting an acute onset of short term memory loss.  He had gotten into his car and then sat there and did not know why he was there.  His family had noticed some unusual behavior over the past week-shaving over part of his face at a time.  CT head was abnormal and a brain MRI confirmed a 7.8 cm mass with extensive edema in the frontal lobes crossing the corpus callosum with a a left to right shift.  He was started on dexamethasone and neurosurgery was consulted.  He had a h/o CVA and neurology was consulted.  CXR was neg. And laboratory evaluation was unremarkable.   His wife reports h/o a skin cancer excision from his left neck in May 2019.  He has a h/o prostate cancer more than 5 years ago, treated with XRT and radiation beads.  He is seen annually by urology and has never recurred.      10/23/19  Hematology/Oncology was consulted for his brain mass.      PCP: Rohit Goodman MD    History:  Past Medical History:   Diagnosis Date   • CVA (cerebral vascular accident) (CMS/HCC)    • Hypertension    • Prostate cancer (CMS/HCC)     Currently in radiation therapy    , Past Surgical History:   Procedure Laterality Date   • CARDIAC CATHETERIZATION  2015   • SKIN CANCER EXCISION      Neck    , Family History   Problem Relation Age of Onset   • Colon cancer Mother    • Stroke Father    • Lung cancer Brother    , Social History     Tobacco Use   • Smoking status: Never Smoker   Substance Use Topics   • Alcohol use: No     Frequency: Never   • Drug use: No   , Medications Prior to Admission   Medication Sig Dispense Refill Last Dose   • aspirin (ASPIR-LOW) 81 MG EC tablet Take 81 mg by mouth Daily.   Taking   • atenolol  "(TENORMIN) 25 MG tablet Take 25 mg by mouth Daily.   Taking   • pravastatin (PRAVACHOL) 20 MG tablet Take 20 mg by mouth Daily.   Taking   • sertraline (ZOLOFT) 25 MG tablet Take 25 mg by mouth Daily.   Taking   , Scheduled Meds:    dexamethasone 8 mg Intravenous Q12H   famotidine 20 mg Intravenous Q12H   insulin lispro 0-7 Units Subcutaneous Q6H   levETIRAcetam 500 mg Intravenous Q12H   sodium chloride 10 mL Intravenous Q12H   , Continuous Infusions:   , PRN Meds:  dextrose  •  dextrose  •  glucagon (human recombinant)  •  influenza vaccine  •  sodium chloride  •  sodium chloride   Allergies:  Patient has no known allergies.    ROS:  Review of Systems   Constitutional: Negative for diaphoresis, fatigue, fever and unexpected weight change.   HENT: Negative for congestion and nosebleeds.    Eyes: Negative.    Respiratory: Negative for cough and shortness of breath.    Cardiovascular: Negative for chest pain and leg swelling.   Gastrointestinal: Negative for abdominal pain, blood in stool, constipation, diarrhea, nausea and vomiting.   Endocrine: Negative for cold intolerance and heat intolerance.   Genitourinary: Negative for dysuria and hematuria.   Musculoskeletal: Negative for arthralgias and joint swelling.   Skin: Negative for rash and wound.   Neurological: Negative for numbness and headaches.        Memory problems.     Hematological: Does not bruise/bleed easily.   Psychiatric/Behavioral: Positive for behavioral problems and confusion. The patient is not nervous/anxious.    All other systems reviewed and are negative.       Objective     Vital Signs:   /71   Pulse 77   Temp 97.7 °F (36.5 °C) (Oral)   Resp 18   Ht 190.5 cm (75\")   Wt 122 kg (268 lb 11.9 oz)   SpO2 93%   BMI 33.59 kg/m²     Physical Exam:  Physical Exam   Constitutional: He appears well-developed and well-nourished.   Healthy appearing with multiple family members present. Obese.    HENT:   Head: Normocephalic and atraumatic. "   Mouth/Throat: Oropharynx is clear and moist.   Eyes: Conjunctivae, EOM and lids are normal. Pupils are equal, round, and reactive to light.   Neck: Normal range of motion. Neck supple. No thyromegaly present.   Cardiovascular: Normal rate, regular rhythm and normal heart sounds.   No murmur heard.  Pulmonary/Chest: Effort normal and breath sounds normal. No respiratory distress.   Abdominal: Soft. Normal appearance and bowel sounds are normal. He exhibits no distension.   Genitourinary:   Genitourinary Comments: Deferred.   Musculoskeletal: Normal range of motion. He exhibits no edema.   Lymphadenopathy:     He has no cervical adenopathy.     He has no axillary adenopathy.        Right: No supraclavicular adenopathy present.        Left: No supraclavicular adenopathy present.   Neurological: He is alert.   Unable to state how many children he has or which hospital he is in. He knows his family.    Skin: Skin is warm and dry. Capillary refill takes less than 2 seconds. No bruising, no petechiae and no rash noted.   Psychiatric: He has a normal mood and affect. His speech is normal and behavior is normal. Judgment and thought content normal. Cognition and memory are normal.   Nursing note and vitals reviewed.       Results Review:  Lab Results (last 48 hours)     Procedure Component Value Units Date/Time    POC Glucose Once [[id number]]  (Abnormal) Collected:  10/23/19 1402    Specimen:  Blood Updated:  10/23/19 1403     Glucose 150 mg/dL      Comment: Serial Number: [id number]:  [id number]       POC Glucose Once [[id number]]  (Abnormal) Collected:  10/23/19 1246    Specimen:  Blood Updated:  10/23/19 1247     Glucose 154 mg/dL      Comment: Serial Number: [id number]:  [id number]       Vitamin B12 [[id number]] Collected:  10/23/19 1127    Specimen:  Blood Updated:  10/23/19 1145    Hemoglobin A1c [[id number]]  (Normal) Collected:  10/23/19 0403    Specimen:  Blood Updated:  10/23/19 1059     Hemoglobin A1C 5.3  %     Narrative:       Hemoglobin A1C Reference Range:    <5.7 %        Normal  5.7-6.4 %     Increased risk for diabetes  > 6.4 %        Diabetes       These guidelines have been recommended by the American Diabetic Association for Hgb A1c.      The following 2010 guidelines have been recommended by the American Diabetes Association for Hemoglobin A1c.    HBA1c 5.7-6.4% Increased risk for future diabetes (pre-diabetes)  HBA1c     >6.4% Diabetes    TSH [[id number]]  (Normal) Collected:  10/22/19 2055    Specimen:  Blood from Arm, Right Updated:  10/23/19 1037     TSH 3.120 uIU/mL     Lipid Panel [[id number]]  (Abnormal) Collected:  10/22/19 2055    Specimen:  Blood from Arm, Right Updated:  10/23/19 1030     Total Cholesterol 81 mg/dL      Triglycerides 158 mg/dL      HDL Cholesterol 24 mg/dL      LDL Cholesterol  25 mg/dL      VLDL Cholesterol 31.6 mg/dL      LDL/HDL Ratio 1.06    Narrative:       Cholesterol Reference Ranges  (U.S. Department of Health and Human Services ATP III Classifications)    Desirable          <200 mg/dL  Borderline High    200-239 mg/dL  High Risk          >240 mg/dL      Triglyceride Reference Ranges  (U.S. Department of Health and Human Services ATP III Classifications)    Normal           <150 mg/dL  Borderline High  150-199 mg/dL  High             200-499 mg/dL  Very High        >500 mg/dL    HDL Reference Ranges  (U.S. Department of Health and Human Services ATP III Classifcations)    Low     <40 mg/dl (major risk factor for CHD)  High    >60 mg/dl ('negative' risk factor for CHD)        LDL Reference Ranges  (U.S. Department of Health and Human Services ATP III Classifcations)    Optimal          <100 mg/dL  Near Optimal     100-129 mg/dL  Borderline High  130-159 mg/dL  High             160-189 mg/dL  Very High        >189 mg/dL    CBC & Differential [[id number]] Collected:  10/23/19 0403    Specimen:  Blood Updated:  10/23/19 0441    Narrative:       The following orders were created  for panel order CBC & Differential.  Procedure                               Abnormality         Status                     ---------                               -----------         ------                     CBC Auto Differential[[id number]]        Abnormal            Final result                 Please view results for these tests on the individual orders.    CBC Auto Differential [[id number]]  (Abnormal) Collected:  10/23/19 0403    Specimen:  Blood Updated:  10/23/19 0441     WBC 7.50 10*3/mm3      RBC 4.20 10*6/mm3      Hemoglobin 14.0 g/dL      Hematocrit 40.4 %      MCV 96.2 fL      MCH 33.4 pg      MCHC 34.7 g/dL      RDW 13.6 %      RDW-SD 45.9 fl      MPV 8.7 fL      Platelets 187 10*3/mm3      Neutrophil % 84.6 %      Lymphocyte % 14.1 %      Monocyte % 1.0 %      Eosinophil % 0.1 %      Basophil % 0.2 %      Neutrophils, Absolute 6.30 10*3/mm3      Lymphocytes, Absolute 1.10 10*3/mm3      Monocytes, Absolute 0.10 10*3/mm3      Eosinophils, Absolute 0.00 10*3/mm3      Basophils, Absolute 0.00 10*3/mm3      nRBC 0.1 /100 WBC     Magnesium [[id number]]  (Normal) Collected:  10/22/19 2055    Specimen:  Blood from Arm, Right Updated:  10/23/19 0314     Magnesium 1.9 mg/dL     MRSA Screen Culture - Swab, Nares [[id number]] Collected:  10/23/19 0111    Specimen:  Swab from Nares Updated:  10/23/19 0114    POC Glucose Once [[id number]]  (Abnormal) Collected:  10/22/19 2339    Specimen:  Blood Updated:  10/22/19 2340     Glucose 111 mg/dL      Comment: Serial Number: [id number]:  [id number]       Tyler Draw [[id number]] Collected:  10/22/19 2013    Specimen:  Blood Updated:  10/22/19 2213    Narrative:       The following orders were created for panel order Tyler Draw.  Procedure                               Abnormality         Status                     ---------                               -----------         ------                     Light Blue Top[[id number]]                                   Final  result               Green Top (Gel)[[id number]]                                  Final result               Lavender Top[[id number]]                                     Final result               Gold Top - SST[[id number]]                                   Final result                 Please view results for these tests on the individual orders.    Light Blue Top [[id number]] Collected:  10/22/19 2013    Specimen:  Blood from Arm, Left Updated:  10/22/19 2213     Extra Tube hold for add-on     Comment: Auto resulted       Green Top (Gel) [[id number]] Collected:  10/22/19 2013    Specimen:  Blood from Arm, Left Updated:  10/22/19 2213     Extra Tube Hold for add-ons.     Comment: Auto resulted.       Lavender Top [[id number]] Collected:  10/22/19 2013    Specimen:  Blood from Arm, Left Updated:  10/22/19 2213     Extra Tube hold for add-on     Comment: Auto resulted       Gold Top - SST [[id number]] Collected:  10/22/19 2013    Specimen:  Blood from Arm, Left Updated:  10/22/19 2213     Extra Tube Hold for add-ons.     Comment: Auto resulted.       Comprehensive Metabolic Panel [[id number]]  (Abnormal) Collected:  10/22/19 2055    Specimen:  Blood from Arm, Right Updated:  10/22/19 2121     Glucose 155 mg/dL      BUN 15 mg/dL      Creatinine 0.72 mg/dL      Sodium 145 mmol/L      Potassium 4.0 mmol/L      Chloride 107 mmol/L      CO2 27.0 mmol/L      Calcium 8.7 mg/dL      Total Protein 6.2 g/dL      Albumin 4.10 g/dL      ALT (SGPT) 17 U/L      AST (SGOT) 17 U/L      Alkaline Phosphatase 58 U/L      Total Bilirubin 0.4 mg/dL      eGFR Non African Amer 104 mL/min/1.73      Globulin 2.1 gm/dL      A/G Ratio 2.0 g/dL      BUN/Creatinine Ratio 20.8     Anion Gap 11.0 mmol/L     Narrative:       GFR Normal >60  Chronic Kidney Disease <60  Kidney Failure <15    Protime-INR [[id number]]  (Normal) Collected:  10/22/19 2013    Specimen:  Blood from Arm, Left Updated:  10/22/19 2046     Protime 10.6 Seconds      INR 1.02    aPTT  [[id number]]  (Abnormal) Collected:  10/22/19 2013    Specimen:  Blood from Arm, Left Updated:  10/22/19 2046     PTT 20.9 seconds     Troponin [[id number]]  (Normal) Collected:  10/22/19 2013    Specimen:  Blood from Arm, Left Updated:  10/22/19 2044     Troponin T <0.010 ng/mL     Narrative:       Troponin T Reference Range:  <= 0.03 ng/mL-   Negative for AMI  >0.03 ng/mL-     Abnormal for myocardial necrosis.  Clinicians would have to utilize clinical acumen, EKG, Troponin and serial changes to determine if it is an Acute Myocardial Infarction or myocardial injury due to an underlying chronic condition.     CBC & Differential [[id number]] Collected:  10/22/19 2013    Specimen:  Blood Updated:  10/22/19 2020    Narrative:       The following orders were created for panel order CBC & Differential.  Procedure                               Abnormality         Status                     ---------                               -----------         ------                     CBC Auto Differential[[id number]]        Abnormal            Final result                 Please view results for these tests on the individual orders.    CBC Auto Differential [[id number]]  (Abnormal) Collected:  10/22/19 2013    Specimen:  Blood from Arm, Left Updated:  10/22/19 2020     WBC 8.00 10*3/mm3      RBC 4.43 10*6/mm3      Hemoglobin 14.7 g/dL      Hematocrit 42.9 %      MCV 97.0 fL      MCH 33.1 pg      MCHC 34.2 g/dL      RDW 13.5 %      RDW-SD 45.9 fl      MPV 8.6 fL      Platelets 215 10*3/mm3      Neutrophil % 67.4 %      Lymphocyte % 21.8 %      Monocyte % 7.9 %      Eosinophil % 1.9 %      Basophil % 1.0 %      Neutrophils, Absolute 5.40 10*3/mm3      Lymphocytes, Absolute 1.70 10*3/mm3      Monocytes, Absolute 0.60 10*3/mm3      Eosinophils, Absolute 0.20 10*3/mm3      Basophils, Absolute 0.10 10*3/mm3      nRBC 0.0 /100 WBC     POC Glucose Once [[id number]]  (Abnormal) Collected:  10/22/19 2009    Specimen:  Blood Updated:  10/22/19  2010     Glucose 124 mg/dL      Comment: Serial Number: [id number]:  [id number]              Pending Results: CT C/A/P, path from Dr. Fair.    Imaging Reviewed:   Mri Brain With & Without Contrast    Result Date: 10/23/2019  This study is abnormal. There are enhancing abnormalities in the bilateral frontal lobes, which extend across the genu of the corpus callosum and expand the corpus callosum anteriorly. 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 Lymphoma cannot be excluded but is thought to be less likely. There is extensive surrounding vasogenic or tumor edema with associated mass effect. There is approximately 8 mm of left-to-right subfalcine herniation associated with the findings. No definite acute intracranial hemorrhage is seen. No definite acute infarct is identified. There is associated deformity of the lateral ventricles, greater on the left, especially involving the anterior horns of the lateral ventricles.  Electronically Signed By-Dr. Derick Kiser MD On:10/23/2019 2:05 PM This report was finalized on [id number] by Dr. Derick Kiser MD.    Xr Chest 1 View    Result Date: 10/22/2019  No active disease.  Electronically Signed By-Rohit Villa On:10/22/2019 9:03 PM This report was finalized on [id number] by  Rohit Villa, .    Ct Head Without Contrast Stroke Protocol    Result Date: 10/22/2019  IMPRESSION :  1. There is a hyperdense mass located within the frontal lobes which crosses midline through the corpus callosum. There appear to be additional hyperdense masses in the left corona radiata. This is concerning for a GBM versus CNS lymphoma. I would recommend an MRI of the brain with and without contrast for follow-up. The findings were discussed with Dr. Yoseph Vincent M.D. by telephone. 2. There is vasogenic edema which is most marked in the left frontal lobe. I cannot completely exclude encephalomalacia in the left frontal lobe. 3. There is 7 mm a left to  right subfalcine shift. There is no transtentorial herniation. 4. Questionable old lacunar infarct in the left aspect of the virgilio.  Electronically Signed By-Rohit Villa On:10/22/2019 8:31 PM This report was finalized on [id number] by  Rohit Villa, .      I have reviewed the patient's labs, imaging, reports, and other clinician documentation.         Assessment/Plan       ASSESSMENT  1. Frontal brain mass-Neurosurgery consulted. On dexamethasone, pepcid and keppra.  Body CT's pending.  H/O skin cancer removed in 5/2019-will obtain path.  Concern for either a GBM or CNS lymphoma.    2. H/O prostate cancer-diagnosed > 5 yr ago and has never recurred.  UTD on f/u.    3. H/O left neck skin cancer-will obtain path as melanoma skin cancers can spread to the brain.  4. HTN/H/O CVA- ASA on hold for possible brain biopsy.     PLAN  1. Await CT's  2. Will need tissue diagnosis.  3. Obtain path from Dr. Fair's office-our office will obtain.     Electronically signed by KARRI Khan, 10/23/19, 4:47 PM.    I have personally performed a face-to-face diagnostic evaluation on this patient.  I have discussed the case with Cordelia Valencia NP, have edited/reviewed the note,  and agree with the care plan.  The patient is feeling sleepy and on examination is moving all extremities equally.  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  Have had prolonged discussion with family regarding options and they will make up their mind on how to proceed soon.      I discussed the patients findings and my recommendations with patient and family.    Thank you for this consult.  We will be happy to follow along in the care of this patient.     KARRI Salmeron  10/23/19  4:30 PM    Much of the above report is an electronic transcription/translation of the spoken language to printed text using Dragon Software. As such, the subtleties and finesse of the spoken language may permit erroneous, or at times,  nonsensical words or phrases to be inadvertently transcribed; thus changes may be made at a later date to rectify these errors.

## 2019-10-23 NOTE — CONSULTS
Primary Care Provider: Rohit Goodman MD     Consult requested by: Dr. Prajapati    Reason for Consultation: Neurological evaluation    History taken from: patient chart family RN    Chief complaint: Altered mental status, memory loss, abnormal CT Head       SUBJECTIVE:    History of present illness: Background per H&P: Bruce Manzo is a 84 y.o. male who was presented to the ER for increasing confusion and ataxia.  This evening he went out into his car but just sat there.  In addition, the family states that he could not remember what he had for dinner.  Daughter reports some ataxia with a fall last week in the yard. Patient with a PMH sig for hypertension and previous CVA.   Patient's wife reports that he was just prescribed Plavix for what was felt to be a recent CVA but had not started taking it yet.  A code stroke was called in the ER.  Workup with CT scan showed a mass in the frontal lobes with a vasogenic edema and a left to right shift.   Patient was given Decadron in the ER and neurosurgery contacted with initiation of Keppra.   - Portions of the above HPI were copied from previous encounters and edited as appropriate.     Family reports pt has had memory loss and intermittent dysarthria over the past 2 weeks. No apparent seizures or LOC. He did fall about 2 weeks ago and has declined since then.     Review of Systems   Constitutional: Negative.    Neurological: Positive for speech difficulty. Negative for dizziness, tremors, seizures, syncope, facial asymmetry, weakness, light-headedness, numbness and headaches.            PATIENT HISTORY:  Past Medical History:   Diagnosis Date   • CVA (cerebral vascular accident) (CMS/HCC)    • Hypertension    • Prostate cancer (CMS/HCC)     Currently in radiation therapy    ,   Past Surgical History:   Procedure Laterality Date   • CARDIAC CATHETERIZATION  03/30/2015   • SKIN CANCER EXCISION      Neck    ,   Family History   Problem Relation Age of Onset   •  Colon cancer Mother    • Stroke Father    • Lung cancer Brother    ,   Social History     Tobacco Use   • Smoking status: Never Smoker   Substance Use Topics   • Alcohol use: No     Frequency: Never   • Drug use: No   ,   Medications Prior to Admission   Medication Sig Dispense Refill Last Dose   • aspirin (ASPIR-LOW) 81 MG EC tablet Take 81 mg by mouth Daily.   Taking   • atenolol (TENORMIN) 25 MG tablet Take 25 mg by mouth Daily.   Taking   • pravastatin (PRAVACHOL) 20 MG tablet Take 20 mg by mouth Daily.   Taking   • sertraline (ZOLOFT) 25 MG tablet Take 25 mg by mouth Daily.   Taking   , Scheduled Meds:      dexamethasone 8 mg Intravenous Q12H   famotidine 20 mg Intravenous Q12H   gadoteridol 20 mL Intravenous Once in imaging   insulin lispro 0-7 Units Subcutaneous Q6H   levETIRAcetam 500 mg Intravenous Q12H   sodium chloride 10 mL Intravenous Q12H   , Continuous Infusions:   , PRN Meds:  dextrose  •  dextrose  •  glucagon (human recombinant)  •  influenza vaccine  •  sodium chloride  •  sodium chloride, Allergies:  Patient has no known allergies.    ________________________________________________________        OBJECTIVE:  Upon today's exam, pt is awake and alter. Confused speech vs aphasia.          Neurologic Exam  PHYSICAL EXAM:    Constitutional: The patient is in no apparent distress, bright awake and alert. There is no shortness of breath.     HEENT: There is no tenderness over the temporal arteries bilaterally. Normocephalic, atraumatic. TMJ open symmetrically without tenderness.    Chest: Breathing unlabored    Cardiac: Regular rate and rhythm.     Extremities:  No clubbing, cyanosis or edema.    NEUROLOGICAL:    Cognition:   Perseverating. Unclear if he is confused or if this is a component of aphasia.   Concentration and attention normal.   Mild dysarthria.   Able to write. Able to name and repeat. Spontaneous answers are incorrect answering with random one or two word statements.   Short and long  term memory appears intact    Cranial nerves;    II - pupils bilaterally equal reacting to light,  No new Visual field deficits;  Fundoscopic exam- Not able to be done, non-dilated exam  III,IV,VI: EOMI with no diplopia  V: Normal facial sensations  VII: No facial asymmetry,  VIII: No New hearing abnormality  IX, X, XI: normal gag and shoulder shrug;  XII: tongue is in the midline.    Sensory:  Intact to light touch in all extremities.     Motor: Strength 5/5 bilaterally upper and lower extremities. No involuntary movements present. Normal tone and bulk.  Deep tendon reflexes: 2/4 and symmetrical in biceps, brachioradialis, triceps, bilateral 1/4 knees and ankles. Both plantars are flexor.    Cerebellar: Finger to nose and mirror movements normal bilaterally.    Gait and balance: deferred    ________________________________________________________   RESULTS REVIEW:    VITAL SIGNS:   Temp:  [97.4 °F (36.3 °C)-98.4 °F (36.9 °C)] 97.7 °F (36.5 °C)  Heart Rate:  [61-79] 77  Resp:  [18] 18  BP: (103-136)/(58-76) 131/71     LABS:  WBC   Date Value Ref Range Status   10/23/2019 7.50 3.40 - 10.80 10*3/mm3 Final     RBC   Date Value Ref Range Status   10/23/2019 4.20 4.14 - 5.80 10*6/mm3 Final     Hemoglobin   Date Value Ref Range Status   10/23/2019 14.0 13.0 - 17.7 g/dL Final     Hematocrit   Date Value Ref Range Status   10/23/2019 40.4 37.5 - 51.0 % Final     MCV   Date Value Ref Range Status   10/23/2019 96.2 79.0 - 97.0 fL Final     MCH   Date Value Ref Range Status   10/23/2019 33.4 (H) 26.6 - 33.0 pg Final     MCHC   Date Value Ref Range Status   10/23/2019 34.7 31.5 - 35.7 g/dL Final     RDW   Date Value Ref Range Status   10/23/2019 13.6 12.3 - 15.4 % Final     RDW-SD   Date Value Ref Range Status   10/23/2019 45.9 37.0 - 54.0 fl Final     MPV   Date Value Ref Range Status   10/23/2019 8.7 6.0 - 12.0 fL Final     Platelets   Date Value Ref Range Status   10/23/2019 187 140 - 450 10*3/mm3 Final     Neutrophil %    Date Value Ref Range Status   10/23/2019 84.6 (H) 42.7 - 76.0 % Final     Lymphocyte %   Date Value Ref Range Status   10/23/2019 14.1 (L) 19.6 - 45.3 % Final     Monocyte %   Date Value Ref Range Status   10/23/2019 1.0 (L) 5.0 - 12.0 % Final     Eosinophil %   Date Value Ref Range Status   10/23/2019 0.1 (L) 0.3 - 6.2 % Final     Basophil %   Date Value Ref Range Status   10/23/2019 0.2 0.0 - 1.5 % Final     Neutrophils, Absolute   Date Value Ref Range Status   10/23/2019 6.30 1.70 - 7.00 10*3/mm3 Final     Lymphocytes, Absolute   Date Value Ref Range Status   10/23/2019 1.10 0.70 - 3.10 10*3/mm3 Final     Monocytes, Absolute   Date Value Ref Range Status   10/23/2019 0.10 0.10 - 0.90 10*3/mm3 Final     Eosinophils, Absolute   Date Value Ref Range Status   10/23/2019 0.00 0.00 - 0.40 10*3/mm3 Final     Basophils, Absolute   Date Value Ref Range Status   10/23/2019 0.00 0.00 - 0.20 10*3/mm3 Final     nRBC   Date Value Ref Range Status   10/23/2019 0.1 0.0 - 0.2 /100 WBC Final     Glucose   Date Value Ref Range Status   10/22/2019 155 (H) 65 - 99 mg/dL Final     BUN   Date Value Ref Range Status   10/22/2019 15 8 - 23 mg/dL Final     Creatinine   Date Value Ref Range Status   10/22/2019 0.72 (L) 0.76 - 1.27 mg/dL Final     Sodium   Date Value Ref Range Status   10/22/2019 145 136 - 145 mmol/L Final     Potassium   Date Value Ref Range Status   10/22/2019 4.0 3.5 - 5.2 mmol/L Final     Chloride   Date Value Ref Range Status   10/22/2019 107 98 - 107 mmol/L Final     CO2   Date Value Ref Range Status   10/22/2019 27.0 22.0 - 29.0 mmol/L Final     Calcium   Date Value Ref Range Status   10/22/2019 8.7 8.6 - 10.5 mg/dL Final     Total Protein   Date Value Ref Range Status   10/22/2019 6.2 6.0 - 8.5 g/dL Final     Albumin   Date Value Ref Range Status   10/22/2019 4.10 3.50 - 5.20 g/dL Final     ALT (SGPT)   Date Value Ref Range Status   10/22/2019 17 1 - 41 U/L Final     AST (SGOT)   Date Value Ref Range Status    10/22/2019 17 1 - 40 U/L Final     Alkaline Phosphatase   Date Value Ref Range Status   10/22/2019 58 39 - 117 U/L Final     Total Bilirubin   Date Value Ref Range Status   10/22/2019 0.4 0.2 - 1.2 mg/dL Final     eGFR Non  Amer   Date Value Ref Range Status   10/22/2019 104 >60 mL/min/1.73 Final     BUN/Creatinine Ratio   Date Value Ref Range Status   10/22/2019 20.8 7.0 - 25.0 Final     Anion Gap   Date Value Ref Range Status   10/22/2019 11.0 5.0 - 15.0 mmol/L Final       Lab Results   Component Value Date    TSH 3.120 10/22/2019    LDL 25 10/22/2019    HGBA1C 5.3 10/23/2019         IMAGING STUDIES:  Xr Chest 1 View    Result Date: 10/22/2019  No active disease.  Electronically Signed By-Rohit Villa On:10/22/2019 9:03 PM This report was finalized on [id number] by  Rohit Villa, .    Ct Head Without Contrast Stroke Protocol    Result Date: 10/22/2019  IMPRESSION :  1. There is a hyperdense mass located within the frontal lobes which crosses midline through the corpus callosum. There appear to be additional hyperdense masses in the left corona radiata. This is concerning for a GBM versus CNS lymphoma. I would recommend an MRI of the brain with and without contrast for follow-up. The findings were discussed with Dr. Yoseph Vincent M.D. by telephone. 2. There is vasogenic edema which is most marked in the left frontal lobe. I cannot completely exclude encephalomalacia in the left frontal lobe. 3. There is 7 mm a left to right subfalcine shift. There is no transtentorial herniation. 4. Questionable old lacunar infarct in the left aspect of the virgilio.  Electronically Signed ByJemima Villa On:10/22/2019 8:31 PM This report was finalized on [id number] by  Rohit Villa, .      I reviewed the patient's new clinical results.      ________________________________________________________     PROBLEM LIST:    Memory loss due to medical condition    Brain mass    Altered mental state          Assessment/Plan    ASSESSMENT/PLAN:  1. Brain mass located within the frontal lobes, crossing midline through the corpus callosum. 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 Workup in progress.   - CT head: Hyperdense mass located within the frontal lobes which crosses midline through the corpus callosum.  Vasogenic edema which is most marked in the left frontal lobe. Encephalomalacia in the left frontal lobe cannot be ruled out. 7 mm a left to right subfalcine shift. There is no transtentorial herniation. Questionable old lacunar infarct in the left aspect of the virgilio.  - MRI brain w/wo: scheduled for this afternoon  - Keppra 500mg BID  - Labs: A1C: 5.3, LDL: 25, B12: P, TSH: 3.12  - Neurosurgery following- decadron ordered, awaiting MRI for further recommendations.   - Hold antithrombotics until evaluated by neurosurgery.   - Seizure precations    2. Altered mental status, memory loss, dysarthria. Likely second to large frontal lobe mass with significant cerebral edema. An underlying stroke cannot be ruled out.   - Further workup as above.     3. HTN  - Strict BP control    4. Modification of stroke risk factors:   - Blood pressure should be less than 130/80 outpatient, HbA1c less than 6.5, LDL less than 70; b12>500 and smoking cessation if applicable. We would be grateful if the primary team / primary care physician keep a close watch on this above targets.  - Stroke education  - Follow up with neurologist of choice      I discussed the patients findings and my recommendations with patient, family, nursing staff and consulting provider    KARRI Anthony  10/23/19  1:13 PM

## 2019-10-23 NOTE — PROGRESS NOTES
Pulmonary/ Critical Care progress Note        Patient Name:  Bruce Manzo    :  1935    Medical Record:  [id number]    Primary Care Physician     Rohit Goodman MD [facility]  Bruce Manzo is a 84 y.o. male who was presented to the ER for increasing confusion and ataxia.  This evening he went out into his car but just sat there.  In addition, the family states that he could not remember what he had for dinner.  Daughter reports some ataxia with a fall last week in the yard. Patient with a PMH sig for hypertension and previous CVA.   Patient's wife reports that he was just prescribed Plavix for what was felt to be a recent CVA but had not started taking it yet.  A code stroke was called in the ER.  Workup with CT scan showed a mass in the frontal lobes with a vasogenic edema and a left to right shift.   Patient was given Decadron in the ER and neurosurgery contacted with initiation of Keppra.      10/23/19:  Remains with dysarthria    Review of Systems    As above     Home medicationS  Prior to Admission medications    Medication Sig Start Date End Date Taking? Authorizing Provider   aspirin (ASPIR-LOW) 81 MG EC tablet Take 81 mg by mouth Daily. 12/10/18   Carlin Bermudez MD   atenolol (TENORMIN) 25 MG tablet Take 25 mg by mouth Daily. 19   Carlin Bermudez MD   pravastatin (PRAVACHOL) 20 MG tablet Take 20 mg by mouth Daily. 19   Carlin Bermudez MD   sertraline (ZOLOFT) 25 MG tablet Take 25 mg by mouth Daily. 19   Carlin Bermudez MD   terbinafine (lamiSIL) 250 MG tablet  5/22/19 10/22/19  Carlin Bermudez MD       Medical History    Past Medical History:   Diagnosis Date   • CVA (cerebral vascular accident) (CMS/HCC)    • Hypertension    • Prostate cancer (CMS/HCC)     Currently in radiation therapy         Surgical History    Past Surgical History:   Procedure Laterality Date   • CARDIAC CATHETERIZATION  2015   • SKIN CANCER EXCISION      Neck          Family History    Family History   Problem Relation Age of Onset   • Colon cancer Mother    • Stroke Father    • Lung cancer Brother        Social History    Social History     Tobacco Use   • Smoking status: Never Smoker   Substance Use Topics   • Alcohol use: No     Frequency: Never        Allergies    No Known Allergies    Medications    Scheduled Meds:    dexamethasone 8 mg Intravenous Q12H   famotidine 20 mg Intravenous Q12H   insulin lispro 0-7 Units Subcutaneous Q6H   sodium chloride 10 mL Intravenous Q12H     Continuous Infusions:   PRN Meds:.dextrose  •  dextrose  •  glucagon (human recombinant)  •  influenza vaccine  •  sodium chloride  •  sodium chloride      Physical Exam    tMax 24 hrs:  Temp (24hrs), Av.9 °F (36.6 °C), Min:97.4 °F (36.3 °C), Max:98.4 °F (36.9 °C)    Vitals Ranges:  Temp:  [97.4 °F (36.3 °C)-98.4 °F (36.9 °C)] 98.4 °F (36.9 °C)  Heart Rate:  [61-79] 79  Resp:  [18] 18  BP: (103-136)/(58-76) 131/71  Intake and Output Last 3 Shifts:  I/O last 3 completed shifts:  In: 50 [IV Piggyback:50]  Out: -     Constitutional:  Alert, no acute respiratory distress   HEENT:  Atraumatic, PERRL, conjunctiva normal, moist oral mucosa, no nasal discharge.  Trachea is midline.  Respiratory:  No respiratory distress, normal breath sounds, no rales, no wheezing   Cardiovascular:  Normal rate, normal rhythm and no murmurs.  Pulses 2+ and equal in all four extremities.    GI:  Soft, nondistended, positive bowel sounds.  :  No costovertebral angle tenderness   Extremities: No edema, cyanosis or tenderness.  Integument:  No rashes.   Neurologic:  Alert & oriented to self and place only.   CN 2-12 normal, normal motor function, normal sensory function, no focal deficits noted   Psychiatric:  Speech and behavior appropriate     labs    Lab Results (last 24 hours)     Procedure Component Value Units Date/Time    CBC & Differential [[id number]] Collected:  10/23/19 0406    Specimen:  Blood Updated:   10/23/19 0441    Narrative:       The following orders were created for panel order CBC & Differential.  Procedure                               Abnormality         Status                     ---------                               -----------         ------                     CBC Auto Differential[[id number]]        Abnormal            Final result                 Please view results for these tests on the individual orders.    CBC Auto Differential [[id number]]  (Abnormal) Collected:  10/23/19 0403    Specimen:  Blood Updated:  10/23/19 0441     WBC 7.50 10*3/mm3      RBC 4.20 10*6/mm3      Hemoglobin 14.0 g/dL      Hematocrit 40.4 %      MCV 96.2 fL      MCH 33.4 pg      MCHC 34.7 g/dL      RDW 13.6 %      RDW-SD 45.9 fl      MPV 8.7 fL      Platelets 187 10*3/mm3      Neutrophil % 84.6 %      Lymphocyte % 14.1 %      Monocyte % 1.0 %      Eosinophil % 0.1 %      Basophil % 0.2 %      Neutrophils, Absolute 6.30 10*3/mm3      Lymphocytes, Absolute 1.10 10*3/mm3      Monocytes, Absolute 0.10 10*3/mm3      Eosinophils, Absolute 0.00 10*3/mm3      Basophils, Absolute 0.00 10*3/mm3      nRBC 0.1 /100 WBC     Magnesium [[id number]]  (Normal) Collected:  10/22/19 2055    Specimen:  Blood from Arm, Right Updated:  10/23/19 0314     Magnesium 1.9 mg/dL     MRSA Screen Culture - Swab, Nares [[id number]] Collected:  10/23/19 0111    Specimen:  Swab from Nares Updated:  10/23/19 0114    POC Glucose Once [[id number]]  (Abnormal) Collected:  10/22/19 2339    Specimen:  Blood Updated:  10/22/19 2340     Glucose 111 mg/dL      Comment: Serial Number: [id number]:  [id number]       [place] Draw [[id number]] Collected:  10/22/19 2013    Specimen:  Blood Updated:  10/22/19 2213    Narrative:       The following orders were created for panel order [place] Draw.  Procedure                               Abnormality         Status                     ---------                               -----------         ------                      Light Blue Top[[id number]]                                   Final result               Green Top (Gel)[[id number]]                                  Final result               Lavender Top[[id number]]                                     Final result               Gold Top - SST[[id number]]                                   Final result                 Please view results for these tests on the individual orders.    Light Blue Top [[id number]] Collected:  10/22/19 2013    Specimen:  Blood from Arm, Left Updated:  10/22/19 2213     Extra Tube hold for add-on     Comment: Auto resulted       Green Top (Gel) [[id number]] Collected:  10/22/19 2013    Specimen:  Blood from Arm, Left Updated:  10/22/19 2213     Extra Tube Hold for add-ons.     Comment: Auto resulted.       Lavender Top [[id number]] Collected:  10/22/19 2013    Specimen:  Blood from Arm, Left Updated:  10/22/19 2213     Extra Tube hold for add-on     Comment: Auto resulted       Gold Top - SST [[id number]] Collected:  10/22/19 2013    Specimen:  Blood from Arm, Left Updated:  10/22/19 2213     Extra Tube Hold for add-ons.     Comment: Auto resulted.       Comprehensive Metabolic Panel [[id number]]  (Abnormal) Collected:  10/22/19 2055    Specimen:  Blood from Arm, Right Updated:  10/22/19 2121     Glucose 155 mg/dL      BUN 15 mg/dL      Creatinine 0.72 mg/dL      Sodium 145 mmol/L      Potassium 4.0 mmol/L      Chloride 107 mmol/L      CO2 27.0 mmol/L      Calcium 8.7 mg/dL      Total Protein 6.2 g/dL      Albumin 4.10 g/dL      ALT (SGPT) 17 U/L      AST (SGOT) 17 U/L      Alkaline Phosphatase 58 U/L      Total Bilirubin 0.4 mg/dL      eGFR Non African Amer 104 mL/min/1.73      Globulin 2.1 gm/dL      A/G Ratio 2.0 g/dL      BUN/Creatinine Ratio 20.8     Anion Gap 11.0 mmol/L     Narrative:       GFR Normal >60  Chronic Kidney Disease <60  Kidney Failure <15    Protime-INR [[id number]]  (Normal) Collected:  10/22/19 2013    Specimen:  Blood from Arm, Left  Updated:  10/22/19 2046     Protime 10.6 Seconds      INR 1.02    aPTT [[id number]]  (Abnormal) Collected:  10/22/19 2013    Specimen:  Blood from Arm, Left Updated:  10/22/19 2046     PTT 20.9 seconds     Troponin [[id number]]  (Normal) Collected:  10/22/19 2013    Specimen:  Blood from Arm, Left Updated:  10/22/19 2044     Troponin T <0.010 ng/mL     Narrative:       Troponin T Reference Range:  <= 0.03 ng/mL-   Negative for AMI  >0.03 ng/mL-     Abnormal for myocardial necrosis.  Clinicians would have to utilize clinical acumen, EKG, Troponin and serial changes to determine if it is an Acute Myocardial Infarction or myocardial injury due to an underlying chronic condition.     CBC & Differential [[id number]] Collected:  10/22/19 2013    Specimen:  Blood Updated:  10/22/19 2020    Narrative:       The following orders were created for panel order CBC & Differential.  Procedure                               Abnormality         Status                     ---------                               -----------         ------                     CBC Auto Differential[[id number]]        Abnormal            Final result                 Please view results for these tests on the individual orders.    CBC Auto Differential [[id number]]  (Abnormal) Collected:  10/22/19 2013    Specimen:  Blood from Arm, Left Updated:  10/22/19 2020     WBC 8.00 10*3/mm3      RBC 4.43 10*6/mm3      Hemoglobin 14.7 g/dL      Hematocrit 42.9 %      MCV 97.0 fL      MCH 33.1 pg      MCHC 34.2 g/dL      RDW 13.5 %      RDW-SD 45.9 fl      MPV 8.6 fL      Platelets 215 10*3/mm3      Neutrophil % 67.4 %      Lymphocyte % 21.8 %      Monocyte % 7.9 %      Eosinophil % 1.9 %      Basophil % 1.0 %      Neutrophils, Absolute 5.40 10*3/mm3      Lymphocytes, Absolute 1.70 10*3/mm3      Monocytes, Absolute 0.60 10*3/mm3      Eosinophils, Absolute 0.20 10*3/mm3      Basophils, Absolute 0.10 10*3/mm3      nRBC 0.0 /100 WBC     POC Glucose Once [[id number]]   (Abnormal) Collected:  10/22/19 2009    Specimen:  Blood Updated:  10/22/19 2010     Glucose 124 mg/dL      Comment: Serial Number: [id number]:  [id number]             Imaging & Other Studies    Imaging Results (last 72 hours)     Procedure Component Value Units Date/Time    XR Chest 1 View [[id number]] Collected:  10/22/19 2101     Updated:  10/22/19 2105    Narrative:       DATE OF EXAM:  10/22/2019 8:54 PM     PROCEDURE:  XR CHEST 1 VW-     INDICATIONS:  Altered mental status, hypertension.      COMPARISON:  10/24/2018.     TECHNIQUE:   Single radiographic view of the chest was obtained.     FINDINGS:  The heart size is normal. The pulmonary vascular markings are normal.  There is stable minor scarring in the lung bases. The lungs and pleural  spaces are clear of active disease.  There are mild chronic age-related  changes involving the bony thorax and thoracic aorta.       Impression:       No active disease.     Electronically Signed By-Rohit Villa On:10/22/2019 9:03 PM  This report was finalized on [id number] by  Rohit iVlla, .    CT Head Without Contrast Stroke Protocol [[id number]] Collected:  10/22/19 2026     Updated:  10/22/19 2033    Narrative:          DATE OF EXAM:  10/22/2019 8:15 PM     PROCEDURE:   CT HEAD WO CONTRAST STROKE PROTOCOL-     INDICATIONS:  Cerebral vascular accident protocol, confusion, right-sided weakness,  abnormal gait.     COMPARISON:   MRI of the brain performed on 10/24/2018.     TECHNIQUE:  Routine transaxial cuts were obtained through the head without the  administration of contrast. Automated exposure control and iterative  reconstruction methods were used.     FINDINGS:  Study is markedly abnormal. There is a hyperdense mass located within  the frontal lobes which crosses midline through the corpus callosum.  There appear to be several additional hyperdense masses in the left  corona radiata. This is concerning for either a GBM versus CNS lymphoma.  There appears to be a  large area of vasogenic edema in the left cerebral  hemisphere mainly involving the frontal lobe. There also may be volume  loss due to encephalomalacia. Mild vasogenic edema is seen in the right  frontal lobe. There is effacement of the left lateral ventricle. There  is approximately 7 mm of left-to-right subfalcine shift. The basal  cisterns and fourth ventricle are well-maintained and normal. There may  be an old lacunar infarct within the left aspect of the virgilio. The  orbital contents are normal. The paranasal and mastoid sinuses are  clear. The calvarium is normal.        Impression:       IMPRESSION :     1. There is a hyperdense mass located within the frontal lobes which  crosses midline through the corpus callosum. There appear to be  additional hyperdense masses in the left corona radiata. This is  concerning for a GBM versus CNS lymphoma. I would recommend an MRI of  the brain with and without contrast for follow-up. The findings were  discussed with Dr. Yoseph Vincent M.D. by telephone.  2. There is vasogenic edema which is most marked in the left frontal  lobe. I cannot completely exclude encephalomalacia in the left frontal  lobe.  3. There is 7 mm a left to right subfalcine shift. There is no  transtentorial herniation.  4. Questionable old lacunar infarct in the left aspect of the virgilio.     Electronically Signed By-Rohit Villa On:10/22/2019 8:31 PM  This report was finalized on [id number] by  Rohit Villa, .          Assessment/plan  Brain mass  Ataxia  Acute encephalopathy sec to brain mass   Hypertension  H/o prostate cancer  H/o CVA    Plan:    Decadron loading dose given by ER, will continue 8 mg BID  Neurosurgery consult - no intervention.  Non-surgical  Neurology consulted  [facility]    MRI w/wo with stereotatic guidance protocol ordered   Diet ordered   Blood pressure control with IV agents PRN  Resume home medication as appropriate     PUD: Famotidine  Insulin:  Sliding scale  VTE:   SCDs  Nutrition: Diet ordered    Attending physician statement:  Patient remains critically ill.  Total critical care time spent is 32 minutes which does not include any time for procedures.  Critical care time is exclusive of time spent by the nurse practitioner.  Above note scribed by nurse practitioner for me and later reviewed/modified by me for accuracy . I've examined the patient and reviewed all labs and images.  I have directly participated in the evaluation and management of this patient.  Zeus Prajapati MD  Pulmonary and [facility] [place]  [facility]

## 2019-10-23 NOTE — H&P
Pulmonary/ Critical Care ADMISSION H&P Note        Patient Name:  Bruce Manzo    :  1935    Medical Record:  [id number]    Primary Care Physician     Rohit Goodman MD [facility]  Bruce Manzo is a 84 y.o. male who was presented to the ER for increasing confusion and ataxia.  This evening he went out into his car but just sat there.  In addition, the family states that he could not remember what he had for dinner.  Daughter reports some ataxia with a fall last week in the yard. Patient with a PMH sig for hypertension and previous CVA.   Patient's wife reports that he was just prescribed Plavix for what was felt to be a recent CVA but had not started taking it yet.  A code stroke was called in the ER.  Workup with CT scan showed a mass in the frontal lobes with a vasogenic edema and a left to right shift.   Patient was given Decadron in the ER and neurosurgery contacted with initiation of Keppra.      Review of Systems    Constitutional:  Denies fever or chills   Eyes:  Denies change in visual acuity   HENT:  Denies nasal congestion or sore throat   Respiratory:  Denies cough or shortness of breath   Cardiovascular:  Denies chest pain or edema   GI:  Denies abdominal pain, nausea, vomiting, bloody stools or diarrhea   :  Denies dysuria   Musculoskeletal:  Denies back pain or joint pain   Integument:  Denies rash   Neurologic:  Denies headache, focal weakness or sensory changes   Endocrine:  Denies polyuria or polydipsia   Psychiatric:  Denies depression or anxiety     Home medicationS  Prior to Admission medications    Medication Sig Start Date End Date Taking? Authorizing Provider   aspirin (ASPIR-LOW) 81 MG EC tablet Take 81 mg by mouth Daily. 12/10/18   Carlin Bermudez MD   atenolol (TENORMIN) 25 MG tablet Take 25 mg by mouth Daily. 19   Carlin Bermudez MD   pravastatin (PRAVACHOL) 20 MG tablet Take 20 mg by mouth Daily. 19   Carlin Bermudez MD   sertraline (ZOLOFT) 25  MG tablet Take 25 mg by mouth Daily. 19   Provider, MD Carlin   terbinafine (lamiSIL) 250 MG tablet  5/22/19 10/22/19  Provider, MD Carlin       Medical History    Past Medical History:   Diagnosis Date   • CVA (cerebral vascular accident) (CMS/HCC)    • Hypertension    • Prostate cancer (CMS/HCC)     Currently in radiation therapy         Surgical History    Past Surgical History:   Procedure Laterality Date   • CARDIAC CATHETERIZATION  2015   • SKIN CANCER EXCISION      Neck         Family History    Family History   Problem Relation Age of Onset   • Colon cancer Mother    • Stroke Father    • Lung cancer Brother        Social History    Social History     Tobacco Use   • Smoking status: Never Smoker   Substance Use Topics   • Alcohol use: No     Frequency: Never        Allergies    No Known Allergies    Medications    Scheduled Meds:  [START ON 10/23/2019] dexamethasone 8 mg Intravenous Q12H   famotidine 20 mg Intravenous Q12H   [START ON 10/23/2019] insulin lispro 0-7 Units Subcutaneous Q6H   sodium chloride 10 mL Intravenous Q12H     Continuous Infusions:   PRN Meds:.dextrose  •  dextrose  •  glucagon (human recombinant)  •  sodium chloride  •  sodium chloride      Physical Exam    tMax 24 hrs:  Temp (24hrs), Av.4 °F (36.3 °C), Min:97.4 °F (36.3 °C), Max:97.4 °F (36.3 °C)    Vitals Ranges:  Temp:  [97.4 °F (36.3 °C)] 97.4 °F (36.3 °C)  Heart Rate:  [61-65] 63  Resp:  [18] 18  BP: (108-136)/(60-76) 136/70  Intake and Output Last 3 Shifts:  No intake/output data recorded.    Constitutional:  Alert, no acute respiratory distress   HEENT:  Atraumatic, PERRL, conjunctiva normal, moist oral mucosa, no nasal discharge.  Trachea is midline.  Respiratory:  No respiratory distress, normal breath sounds, no rales, no wheezing   Cardiovascular:  Normal rate, normal rhythm and no murmurs.  Pulses 2+ and equal in all four extremities.    GI:  Soft, nondistended, positive bowel sounds.  :  No  costovertebral angle tenderness   Extremities: No edema, cyanosis or tenderness.  Integument:  No rashes.   Neurologic:  Alert & oriented to self and place only.   CN 2-12 normal, normal motor function, normal sensory function, no focal deficits noted   Psychiatric:  Speech and behavior appropriate     labs    Lab Results (last 24 hours)     Procedure Component Value Units Date/Time    [place] Draw [[id number]] Collected:  10/22/19 2013    Specimen:  Blood Updated:  10/22/19 2213    Narrative:       The following orders were created for panel order [place] Draw.  Procedure                               Abnormality         Status                     ---------                               -----------         ------                     Light Blue Top[[id number]]                                   Final result               Green Top (Gel)[[id number]]                                  Final result               Lavender Top[[id number]]                                     Final result               Gold Top - SST[[id number]]                                   Final result                 Please view results for these tests on the individual orders.    Light Blue Top [[id number]] Collected:  10/22/19 2013    Specimen:  Blood from Arm, Left Updated:  10/22/19 2213     Extra Tube hold for add-on     Comment: Auto resulted       Green Top (Gel) [[id number]] Collected:  10/22/19 2013    Specimen:  Blood from Arm, Left Updated:  10/22/19 2213     Extra Tube Hold for add-ons.     Comment: Auto resulted.       Lavender Top [[id number]] Collected:  10/22/19 2013    Specimen:  Blood from Arm, Left Updated:  10/22/19 2213     Extra Tube hold for add-on     Comment: Auto resulted       Gold Top - SST [[id number]] Collected:  10/22/19 2013    Specimen:  Blood from Arm, Left Updated:  10/22/19 2213     Extra Tube Hold for add-ons.     Comment: Auto resulted.       Comprehensive Metabolic Panel [[id number]]  (Abnormal) Collected:  10/22/19 2055     Specimen:  Blood from Arm, Right Updated:  10/22/19 2121     Glucose 155 mg/dL      BUN 15 mg/dL      Creatinine 0.72 mg/dL      Sodium 145 mmol/L      Potassium 4.0 mmol/L      Chloride 107 mmol/L      CO2 27.0 mmol/L      Calcium 8.7 mg/dL      Total Protein 6.2 g/dL      Albumin 4.10 g/dL      ALT (SGPT) 17 U/L      AST (SGOT) 17 U/L      Alkaline Phosphatase 58 U/L      Total Bilirubin 0.4 mg/dL      eGFR Non African Amer 104 mL/min/1.73      Globulin 2.1 gm/dL      A/G Ratio 2.0 g/dL      BUN/Creatinine Ratio 20.8     Anion Gap 11.0 mmol/L     Narrative:       GFR Normal >60  Chronic Kidney Disease <60  Kidney Failure <15    Protime-INR [[id number]]  (Normal) Collected:  10/22/19 2013    Specimen:  Blood from Arm, Left Updated:  10/22/19 2046     Protime 10.6 Seconds      INR 1.02    aPTT [[id number]]  (Abnormal) Collected:  10/22/19 2013    Specimen:  Blood from Arm, Left Updated:  10/22/19 2046     PTT 20.9 seconds     Troponin [[id number]]  (Normal) Collected:  10/22/19 2013    Specimen:  Blood from Arm, Left Updated:  10/22/19 2044     Troponin T <0.010 ng/mL     Narrative:       Troponin T Reference Range:  <= 0.03 ng/mL-   Negative for AMI  >0.03 ng/mL-     Abnormal for myocardial necrosis.  Clinicians would have to utilize clinical acumen, EKG, Troponin and serial changes to determine if it is an Acute Myocardial Infarction or myocardial injury due to an underlying chronic condition.     CBC & Differential [[id number]] Collected:  10/22/19 2013    Specimen:  Blood Updated:  10/22/19 2020    Narrative:       The following orders were created for panel order CBC & Differential.  Procedure                               Abnormality         Status                     ---------                               -----------         ------                     CBC Auto Differential[[id number]]        Abnormal            Final result                 Please view results for these tests on the individual orders.    CBC Auto  Differential [[id number]]  (Abnormal) Collected:  10/22/19 2013    Specimen:  Blood from Arm, Left Updated:  10/22/19 2020     WBC 8.00 10*3/mm3      RBC 4.43 10*6/mm3      Hemoglobin 14.7 g/dL      Hematocrit 42.9 %      MCV 97.0 fL      MCH 33.1 pg      MCHC 34.2 g/dL      RDW 13.5 %      RDW-SD 45.9 fl      MPV 8.6 fL      Platelets 215 10*3/mm3      Neutrophil % 67.4 %      Lymphocyte % 21.8 %      Monocyte % 7.9 %      Eosinophil % 1.9 %      Basophil % 1.0 %      Neutrophils, Absolute 5.40 10*3/mm3      Lymphocytes, Absolute 1.70 10*3/mm3      Monocytes, Absolute 0.60 10*3/mm3      Eosinophils, Absolute 0.20 10*3/mm3      Basophils, Absolute 0.10 10*3/mm3      nRBC 0.0 /100 WBC     POC Glucose Once [[id number]]  (Abnormal) Collected:  10/22/19 2009    Specimen:  Blood Updated:  10/22/19 2010     Glucose 124 mg/dL      Comment: Serial Number: [id number]:  [id number]             Imaging & Other Studies    Imaging Results (last 72 hours)     Procedure Component Value Units Date/Time    XR Chest 1 View [[id number]] Collected:  10/22/19 2101     Updated:  10/22/19 2105    Narrative:       DATE OF EXAM:  10/22/2019 8:54 PM     PROCEDURE:  XR CHEST 1 VW-     INDICATIONS:  Altered mental status, hypertension.      COMPARISON:  10/24/2018.     TECHNIQUE:   Single radiographic view of the chest was obtained.     FINDINGS:  The heart size is normal. The pulmonary vascular markings are normal.  There is stable minor scarring in the lung bases. The lungs and pleural  spaces are clear of active disease.  There are mild chronic age-related  changes involving the bony thorax and thoracic aorta.       Impression:       No active disease.     Electronically Signed By-Rohit Villa On:10/22/2019 9:03 PM  This report was finalized on [id number] by  Rohit Villa, .    CT Head Without Contrast Stroke Protocol [[id number]] Collected:  10/22/19 2026     Updated:  10/22/19 2033    Narrative:          DATE OF EXAM:  10/22/2019 8:15  PM     PROCEDURE:   CT HEAD WO CONTRAST STROKE PROTOCOL-     INDICATIONS:  Cerebral vascular accident protocol, confusion, right-sided weakness,  abnormal gait.     COMPARISON:   MRI of the brain performed on 10/24/2018.     TECHNIQUE:  Routine transaxial cuts were obtained through the head without the  administration of contrast. Automated exposure control and iterative  reconstruction methods were used.     FINDINGS:  Study is markedly abnormal. There is a hyperdense mass located within  the frontal lobes which crosses midline through the corpus callosum.  There appear to be several additional hyperdense masses in the left  corona radiata. This is concerning for either a GBM versus CNS lymphoma.  There appears to be a large area of vasogenic edema in the left cerebral  hemisphere mainly involving the frontal lobe. There also may be volume  loss due to encephalomalacia. Mild vasogenic edema is seen in the right  frontal lobe. There is effacement of the left lateral ventricle. There  is approximately 7 mm of left-to-right subfalcine shift. The basal  cisterns and fourth ventricle are well-maintained and normal. There may  be an old lacunar infarct within the left aspect of the virgilio. The  orbital contents are normal. The paranasal and mastoid sinuses are  clear. The calvarium is normal.        Impression:       IMPRESSION :     1. There is a hyperdense mass located within the frontal lobes which  crosses midline through the corpus callosum. There appear to be  additional hyperdense masses in the left corona radiata. This is  concerning for a GBM versus CNS lymphoma. I would recommend an MRI of  the brain with and without contrast for follow-up. The findings were  discussed with Dr. Yoseph Vincent M.D. by telephone.  2. There is vasogenic edema which is most marked in the left frontal  lobe. I cannot completely exclude encephalomalacia in the left frontal  lobe.  3. There is 7 mm a left to right subfalcine shift. There  is no  transtentorial herniation.  4. Questionable old lacunar infarct in the left aspect of the virgilio.     Electronically Signed By-Rohit Villa On:10/22/2019 8:31 PM  This report was finalized on [id number] by  Rohit Villa, .          Assessment/plan  Brain mass  Ataxia  Confusion   Hypertension  H/o prostate cancer  H/o CVA    Plan:    Decadron loading dose given by ER, will continue 8 mg BID  Neurosurgery consult  Neurology consult  Keppra per Neurosurgery   MRI w/wo with stereotatic guidance protocol  NPO  Blood pressure control with IV agents PRN    PUD: Famotidine  Insulin:  Sliding scale  VTE:  SCDs  Nutrition: NPO            KARRI Ariza Dr., ICU attending on call, aware of ICU admission and agrees with plan of care    Critical care time 35 minutes excluding time for proceduers

## 2019-10-23 NOTE — PLAN OF CARE
Problem: Fall Risk (Adult)  Intervention: Monitor/Assist with Self Care   10/23/19 08   Activity   Activity Assistance Provided assistance, 1 person   Daily Care Interventions   Self-Care Promotion BADL personal objects within reach;safe use of adaptive equipment encouraged;meal setup provided     Intervention: Reduce Risk/Promote Restraint Free Environment   10/23/19 08   Safety Management   Environmental Safety Modification assistive device/personal items within reach;lighting adjusted;clutter free environment maintained;room near unit station   Safety Promotion/Fall Prevention activity supervised;fall prevention program maintained;elopement precautions initiated;gait belt;muscle strengthening facilitated;nonskid shoes/slippers when out of bed;toileting scheduled;safety round/check completed   Prevent [place] Drop/Fall   Safety/Security Measures bed alarm set     Intervention: Review Medications/Identify Contributors to Fall Risk   10/23/19 1304   Safety Management   Medication Review/Management medications reviewed;dosing adjusted;high risk medications identified;provider consulted;pharmacy consulted       Goal: Identify Related Risk Factors and Signs and Symptoms   10/23/19 1304   Fall Risk (Adult)   Related Risk Factors (Fall Risk) age-related changes;bladder function altered;confusion/agitation;culprit medication(s);fatigue/slow reaction;fear of falling;gait/mobility problems;history of falls;neuro disease/injury;sleep pattern alteration;environment unfamiliar   Signs and Symptoms (Fall Risk) presence of risk factors     Goal: Absence of Fall  Outcome: Ongoing (interventions implemented as appropriate)   10/23/19 1304   Fall Risk (Adult)   Absence of Fall making progress toward outcome       Problem: Patient Care Overview  Goal: Plan of Care Review   10/23/19 1304   Coping/Psychosocial   Plan of Care Reviewed With patient;caregiver;family   Plan of Care Review   Progress no change   OTHER   Outcome Summary  MRI being done today; further conversation to be had on prognosis with neurology and neurosurgery team. Patient continues to have fluctuating severity of aformentioned symptoms with cognitive status. expressive aphasia and dysarthria; trouble comprehending and short term memory loss. NIH 4 as of 0800 on 10/23/2016.      Goal: Individualization and Mutuality  Outcome: Ongoing (interventions implemented as appropriate)   10/23/19 1304   Individualization   Patient Specific Preferences talk slowly and loudly, short sentences, patient has expressive aphasia and dyarthria      Goal: Discharge Needs Assessment   10/23/19 1304   Discharge Needs Assessment   Readmission Within the Last 30 Days no previous admission in last 30 days   Concerns to be Addressed adjustment to diagnosis/illness;care coordination/care conferences;cognitive/perceptual;coping/stress;decision making;discharge planning;financial/insurance;grief and loss   Concerns Comments diagnosis   Patient/Family Anticipates Transition to home with family;home with help/services;family members' home   Patient/Family Anticipated Services at Transition ;durable medical equipment   Transportation Anticipated family or friend will provide   Anticipated Changes Related to Illness inability to care for self   Current Discharge Risk lack of support system/caregiver;physical impairment;dependent with mobility/activities of daily living   Discharge Coordination/Progress New diagnosis; will further assess discharge needs closer to time of discharge    Disability   Equipment Currently Used at Home none     Goal: Interprofessional Rounds/Family Conf  Outcome: Ongoing (interventions implemented as appropriate)   10/23/19 1304   Interdisciplinary Rounds/Family Conf   Summary Palliative Care following; awaiting MRI per neurosurgery order; once resulted will have further discussion with family. Potential downgrade from ICU, Neurology following' as well.    Participants  ;dietitian/nutrition services;family;advanced practice nurse;nursing;occupational therapy;pastoral care;patient;pharmacy;respiratory therapy;physician;social work/services;other (see comments)

## 2019-10-23 NOTE — PLAN OF CARE
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 Per the family, the patient has had progressive weakness for the past two weeks. He is typically an extrovert but over the past two weeks he has become more withdrawn. The family initially attributed this to depression since his daughter was just diagnosed with cancer. The symptoms became more severe over the past 48 hours and the decline in memory surfaced over the past 48 hours. I did clarify with the family that he was diagnosed with prostate cancer several years ago and treated with a resection and radiation. He has been prostate cancer free for several years.      Discussed MRI findings with patient and family. Dr Meek reviewed the MRI as well. As discussed before, only aggressive tumors tend to cross the midline. This is most likely a GBM, but we do want to obtain a CT chest/abd/pel to rule out any other lesions throughout his body.    We will plan on a biopsy later this week, pending further discussion with family after the CT's are performed.     Oncology consulted for co-management of patient care.     Continue Decadron and Keppra. Pt may continue to have a regular diet.     Ordered UA in preparation for biopsy. Pt already has a CXR and EKG on chart along with CBC, CMP, PT/INR, PTT.       Sarah Wheeler PA-C  3:37 PM

## 2019-10-24 NOTE — SIGNIFICANT NOTE
10/24/19 1100   Involvement in Care   Family/Support System, Persons spouse   Involvement in Care at bedside;attentive to patient;supportive of patient   Pastoral/Spiritual Care   Pastoral Care Visit Type follow-up   Pastoral Care Source family request;social work referral   Receptivity to Spiritual Care visit welcomed   Pastoral Care Request spiritual/moral support;prayer support  (contact their )   Pastoral Care Interventions supportive conversation provided;prayer support provided;resource assistance provided;scripture assistance provided   Pastoral Care Response engaged in conversation;receptive of support;thanks expressed   Use of Spiritual Resources non-Hinduism use of spiritual care;prayer;spirituality for coping, indicated strong use of   Pastoral Care Follow-Up follow-up planned regularly for general support   Pt's wife asked for me to contact their .  She shared their story.  They have a daughter that is receiving treatment for cancer and they recently received the diagnosis for the pt. Following their story we prayed together. I called their  and he is going to visit today.

## 2019-10-24 NOTE — PROGRESS NOTES
Full Code / Comfort Care vs. Aggressive Care    Palliative care  met with pt and wife to assess for new needs or concerns.  Pt's wife reports that the neurosurgeon has met with them and reports that they may try to administer some steroids to see if it will help with him symptoms, but wife reports there is no more aggressive treatments to be provided.  Wife brought in copy of living will, copy sent to medical records for upload.  Pt's wife reports she is waiting to speak with intensivist to discuss any further treatment here.  Will follow up with family to discuss hospice care and palliative options.  Will continue to follow.

## 2019-10-24 NOTE — PLAN OF CARE
Problem: Fall Risk (Adult)  Goal: Identify Related Risk Factors and Signs and Symptoms  Outcome: Ongoing (interventions implemented as appropriate)    Goal: Absence of Fall  Outcome: Ongoing (interventions implemented as appropriate)      Problem: Patient Care Overview  Goal: Plan of Care Review  Outcome: Ongoing (interventions implemented as appropriate)   10/24/19 9803   Coping/Psychosocial   Plan of Care Reviewed With patient;spouse;daughter;family   OTHER   Outcome Summary Pt going home tomorrow with hospice care.      Goal: Individualization and Mutuality  Outcome: Ongoing (interventions implemented as appropriate)    Goal: Discharge Needs Assessment  Outcome: Ongoing (interventions implemented as appropriate)    Goal: Interprofessional Rounds/Family Conf  Outcome: Ongoing (interventions implemented as appropriate)

## 2019-10-24 NOTE — DISCHARGE PLACEMENT REQUEST
"Pratima Manzo (84 y.o. Male)     Date of Birth Social Security Number Address Home Phone MRN    1935  [id number] [facility] [place] [facility] [phone] [id number]    Islam Marital Status          Confucianism        Admission Date Admission Type Admitting Provider Attending Provider Department, Room/Bed    10/22/19 Emergency Zeus Prajapati MD Abraham, Amadeo Lucas, MD [facility] INTENSIVE CARE UNIT,     Discharge Date Discharge Disposition Discharge Destination                       Attending Provider:  Zeus Prajapati MD    Allergies:  No Known Allergies    Isolation:  None   Infection:  None   Code Status:  CPR    Ht:  190.5 cm (75\")   Wt:  125 kg (275 lb 5.7 oz)    Admission Cmt:  None   Principal Problem:  None                Active Insurance as of 10/22/2019     Primary Coverage     Payor Plan Insurance Group Employer/Plan Group    MEDICARE MEDICARE A & B      Payor Plan Address Payor Plan Phone Number Payor Plan Fax Number Effective Dates    [address] [phone]  2001 - None Entered    [facility] [id number]       Subscriber Name Subscriber Birth Date Member ID       PRATIMA MANZO 1935 [id number]           Secondary Coverage     Payor Plan Insurance Group Employer/Plan Group     FOR LIFE  FOR LIFE  SUPP      Payor Plan Address Payor Plan Phone Number Payor Plan Fax Number Effective Dates    [address] [phone]  2019 - None Entered    [facility] [address]       Subscriber Name Subscriber Birth Date Member ID       PRATIMA MANOZ 1935 [id number]                 Emergency Contacts      (Rel.) Home Phone Work Phone Mobile Phone    TEREZA DAVE (Daughter) -- -- [phone]               History & Physical      Ronald Caputo APRN at 10/22/19 2328          Pulmonary/ Critical Care ADMISSION H&P Note        Patient Name:  Pratima Manzo    :  1935    Medical Record:  " [id number]    Primary Care Physician     Rohit Goodman MD HOPI  Bruce Manzo is a 84 y.o. male who was presented to the ER for increasing confusion and ataxia.  This evening he went out into his car but just sat there.  In addition, the family states that he could not remember what he had for dinner.  Daughter reports some ataxia with a fall last week in the yard. Patient with a PMH sig for hypertension and previous CVA.   Patient's wife reports that he was just prescribed Plavix for what was felt to be a recent CVA but had not started taking it yet.  A code stroke was called in the ER.  Workup with CT scan showed a mass in the frontal lobes with a vasogenic edema and a left to right shift.   Patient was given Decadron in the ER and neurosurgery contacted with initiation of Keppra.      Review of Systems    Constitutional:  Denies fever or chills   Eyes:  Denies change in visual acuity   HENT:  Denies nasal congestion or sore throat   Respiratory:  Denies cough or shortness of breath   Cardiovascular:  Denies chest pain or edema   GI:  Denies abdominal pain, nausea, vomiting, bloody stools or diarrhea   :  Denies dysuria   Musculoskeletal:  Denies back pain or joint pain   Integument:  Denies rash   Neurologic:  Denies headache, focal weakness or sensory changes   Endocrine:  Denies polyuria or polydipsia   Psychiatric:  Denies depression or anxiety     Home medicationS  Prior to Admission medications    Medication Sig Start Date End Date Taking? Authorizing Provider   aspirin (ASPIR-LOW) 81 MG EC tablet Take 81 mg by mouth Daily. 12/10/18   Carlin Bermudez MD   atenolol (TENORMIN) 25 MG tablet Take 25 mg by mouth Daily. 5/16/19   Carlin Bermudez MD   pravastatin (PRAVACHOL) 20 MG tablet Take 20 mg by mouth Daily. 5/22/19   Carlin Bermudez MD   sertraline (ZOLOFT) 25 MG tablet Take 25 mg by mouth Daily. 5/16/19   Carlin Bermudez MD   terbinafine (lamiSIL) 250 MG tablet  5/22/19  10/22/19  Provider, Carlin, MD       Medical History    Past Medical History:   Diagnosis Date   • CVA (cerebral vascular accident) (CMS/HCC)    • Hypertension    • Prostate cancer (CMS/HCC)     Currently in radiation therapy         Surgical History    Past Surgical History:   Procedure Laterality Date   • CARDIAC CATHETERIZATION  2015   • SKIN CANCER EXCISION      Neck         Family History    Family History   Problem Relation Age of Onset   • Colon cancer Mother    • Stroke Father    • Lung cancer Brother        Social History    Social History     Tobacco Use   • Smoking status: Never Smoker   Substance Use Topics   • Alcohol use: No     Frequency: Never        Allergies    No Known Allergies    Medications    Scheduled Meds:  [START ON 10/23/2019] dexamethasone 8 mg Intravenous Q12H   famotidine 20 mg Intravenous Q12H   [START ON 10/23/2019] insulin lispro 0-7 Units Subcutaneous Q6H   sodium chloride 10 mL Intravenous Q12H     Continuous Infusions:   PRN Meds:.dextrose  •  dextrose  •  glucagon (human recombinant)  •  sodium chloride  •  sodium chloride      Physical Exam    tMax 24 hrs:  Temp (24hrs), Av.4 °F (36.3 °C), Min:97.4 °F (36.3 °C), Max:97.4 °F (36.3 °C)    Vitals Ranges:  Temp:  [97.4 °F (36.3 °C)] 97.4 °F (36.3 °C)  Heart Rate:  [61-65] 63  Resp:  [18] 18  BP: (108-136)/(60-76) 136/70  Intake and Output Last 3 Shifts:  No intake/output data recorded.    Constitutional:  Alert, no acute respiratory distress   HEENT:  Atraumatic, PERRL, conjunctiva normal, moist oral mucosa, no nasal discharge.  Trachea is midline.  Respiratory:  No respiratory distress, normal breath sounds, no rales, no wheezing   Cardiovascular:  Normal rate, normal rhythm and no murmurs.  Pulses 2+ and equal in all four extremities.    GI:  Soft, nondistended, positive bowel sounds.  :  No costovertebral angle tenderness   Extremities: No edema, cyanosis or tenderness.  Integument:  No rashes.   Neurologic:   Alert & oriented to self and place only.   CN 2-12 normal, normal motor function, normal sensory function, no focal deficits noted   Psychiatric:  Speech and behavior appropriate     labs    Lab Results (last 24 hours)     Procedure Component Value Units Date/Time    Dewy Rose Draw [[id number]] Collected:  10/22/19 2013    Specimen:  Blood Updated:  10/22/19 2213    Narrative:       The following orders were created for panel order Dewy Rose Draw.  Procedure                               Abnormality         Status                     ---------                               -----------         ------                     Light Blue Top[[id number]]                                   Final result               Green Top (Gel)[[id number]]                                  Final result               Lavender Top[[id number]]                                     Final result               Gold Top - SST[[id number]]                                   Final result                 Please view results for these tests on the individual orders.    Light Blue Top [[id number]] Collected:  10/22/19 2013    Specimen:  Blood from Arm, Left Updated:  10/22/19 2213     Extra Tube hold for add-on     Comment: Auto resulted       Green Top (Gel) [[id number]] Collected:  10/22/19 2013    Specimen:  Blood from Arm, Left Updated:  10/22/19 2213     Extra Tube Hold for add-ons.     Comment: Auto resulted.       Lavender Top [[id number]] Collected:  10/22/19 2013    Specimen:  Blood from Arm, Left Updated:  10/22/19 2213     Extra Tube hold for add-on     Comment: Auto resulted       Gold Top - SST [[id number]] Collected:  10/22/19 2013    Specimen:  Blood from Arm, Left Updated:  10/22/19 2213     Extra Tube Hold for add-ons.     Comment: Auto resulted.       Comprehensive Metabolic Panel [[id number]]  (Abnormal) Collected:  10/22/19 2055    Specimen:  Blood from Arm, Right Updated:  10/22/19 2121     Glucose 155 mg/dL      BUN 15 mg/dL      Creatinine 0.72  mg/dL      Sodium 145 mmol/L      Potassium 4.0 mmol/L      Chloride 107 mmol/L      CO2 27.0 mmol/L      Calcium 8.7 mg/dL      Total Protein 6.2 g/dL      Albumin 4.10 g/dL      ALT (SGPT) 17 U/L      AST (SGOT) 17 U/L      Alkaline Phosphatase 58 U/L      Total Bilirubin 0.4 mg/dL      eGFR Non African Amer 104 mL/min/1.73      Globulin 2.1 gm/dL      A/G Ratio 2.0 g/dL      BUN/Creatinine Ratio 20.8     Anion Gap 11.0 mmol/L     Narrative:       GFR Normal >60  Chronic Kidney Disease <60  Kidney Failure <15    Protime-INR [[id number]]  (Normal) Collected:  10/22/19 2013    Specimen:  Blood from Arm, Left Updated:  10/22/19 2046     Protime 10.6 Seconds      INR 1.02    aPTT [[id number]]  (Abnormal) Collected:  10/22/19 2013    Specimen:  Blood from Arm, Left Updated:  10/22/19 2046     PTT 20.9 seconds     Troponin [[id number]]  (Normal) Collected:  10/22/19 2013    Specimen:  Blood from Arm, Left Updated:  10/22/19 2044     Troponin T <0.010 ng/mL     Narrative:       Troponin T Reference Range:  <= 0.03 ng/mL-   Negative for AMI  >0.03 ng/mL-     Abnormal for myocardial necrosis.  Clinicians would have to utilize clinical acumen, EKG, Troponin and serial changes to determine if it is an Acute Myocardial Infarction or myocardial injury due to an underlying chronic condition.     CBC & Differential [[id number]] Collected:  10/22/19 2013    Specimen:  Blood Updated:  10/22/19 2020    Narrative:       The following orders were created for panel order CBC & Differential.  Procedure                               Abnormality         Status                     ---------                               -----------         ------                     CBC Auto Differential[[id number]]        Abnormal            Final result                 Please view results for these tests on the individual orders.    CBC Auto Differential [[id number]]  (Abnormal) Collected:  10/22/19 2013    Specimen:  Blood from Arm, Left Updated:  10/22/19  2020     WBC 8.00 10*3/mm3      RBC 4.43 10*6/mm3      Hemoglobin 14.7 g/dL      Hematocrit 42.9 %      MCV 97.0 fL      MCH 33.1 pg      MCHC 34.2 g/dL      RDW 13.5 %      RDW-SD 45.9 fl      MPV 8.6 fL      Platelets 215 10*3/mm3      Neutrophil % 67.4 %      Lymphocyte % 21.8 %      Monocyte % 7.9 %      Eosinophil % 1.9 %      Basophil % 1.0 %      Neutrophils, Absolute 5.40 10*3/mm3      Lymphocytes, Absolute 1.70 10*3/mm3      Monocytes, Absolute 0.60 10*3/mm3      Eosinophils, Absolute 0.20 10*3/mm3      Basophils, Absolute 0.10 10*3/mm3      nRBC 0.0 /100 WBC     POC Glucose Once [[id number]]  (Abnormal) Collected:  10/22/19 2009    Specimen:  Blood Updated:  10/22/19 2010     Glucose 124 mg/dL      Comment: Serial Number: [id number]:  [id number]             Imaging & Other Studies    Imaging Results (last 72 hours)     Procedure Component Value Units Date/Time    XR Chest 1 View [[id number]] Collected:  10/22/19 2101     Updated:  10/22/19 2105    Narrative:       DATE OF EXAM:  10/22/2019 8:54 PM     PROCEDURE:  XR CHEST 1 VW-     INDICATIONS:  Altered mental status, hypertension.      COMPARISON:  10/24/2018.     TECHNIQUE:   Single radiographic view of the chest was obtained.     FINDINGS:  The heart size is normal. The pulmonary vascular markings are normal.  There is stable minor scarring in the lung bases. The lungs and pleural  spaces are clear of active disease.  There are mild chronic age-related  changes involving the bony thorax and thoracic aorta.       Impression:       No active disease.     Electronically Signed By-Rohit Villa On:10/22/2019 9:03 PM  This report was finalized on [id number] by  Rohit Villa, .    CT Head Without Contrast Stroke Protocol [[id number]] Collected:  10/22/19 2026     Updated:  10/22/19 2033    Narrative:          DATE OF EXAM:  10/22/2019 8:15 PM     PROCEDURE:   CT HEAD WO CONTRAST STROKE PROTOCOL-     INDICATIONS:  Cerebral vascular accident protocol,  confusion, right-sided weakness,  abnormal gait.     COMPARISON:   MRI of the brain performed on 10/24/2018.     TECHNIQUE:  Routine transaxial cuts were obtained through the head without the  administration of contrast. Automated exposure control and iterative  reconstruction methods were used.     FINDINGS:  Study is markedly abnormal. There is a hyperdense mass located within  the frontal lobes which crosses midline through the corpus callosum.  There appear to be several additional hyperdense masses in the left  corona radiata. This is concerning for either a GBM versus CNS lymphoma.  There appears to be a large area of vasogenic edema in the left cerebral  hemisphere mainly involving the frontal lobe. There also may be volume  loss due to encephalomalacia. Mild vasogenic edema is seen in the right  frontal lobe. There is effacement of the left lateral ventricle. There  is approximately 7 mm of left-to-right subfalcine shift. The basal  cisterns and fourth ventricle are well-maintained and normal. There may  be an old lacunar infarct within the left aspect of the virgilio. The  orbital contents are normal. The paranasal and mastoid sinuses are  clear. The calvarium is normal.        Impression:       IMPRESSION :     1. There is a hyperdense mass located within the frontal lobes which  crosses midline through the corpus callosum. There appear to be  additional hyperdense masses in the left corona radiata. This is  concerning for a GBM versus CNS lymphoma. I would recommend an MRI of  the brain with and without contrast for follow-up. The findings were  discussed with Dr. Yoseph Vincent M.D. by telephone.  2. There is vasogenic edema which is most marked in the left frontal  lobe. I cannot completely exclude encephalomalacia in the left frontal  lobe.  3. There is 7 mm a left to right subfalcine shift. There is no  transtentorial herniation.  4. Questionable old lacunar infarct in the left aspect of the virgilio.      Electronically Signed By-Rohit Villa On:10/22/2019 8:31 PM  This report was finalized on [id number] by  Rohit Villa, .          Assessment/plan  Brain mass  Ataxia  Confusion   Hypertension  H/o prostate cancer  H/o CVA    Plan:    Decadron loading dose given by ER, will continue 8 mg BID  Neurosurgery consult  Neurology consult  Keppra per Neurosurgery   MRI w/wo with stereotatic guidance protocol  NPO  Blood pressure control with IV agents PRN    PUD: Famotidine  Insulin:  Sliding scale  VTE:  SCDs  Nutrition: NPO            KARRI Ariza Dr., ICU attending on call, aware of ICU admission and agrees with plan of care    Critical care time 35 minutes excluding time for proceduers      Electronically signed by Zeus Prajapati MD at 10/23/19 1234       Hospital Medications (active)       Dose Frequency Start End    dexamethasone sodium phosphate injection 8 mg 8 mg Every 12 Hours 10/23/2019     Sig - Route: Infuse 0.8 mL into a venous catheter Every 12 (Twelve) Hours. - Intravenous    dextrose (D50W) 25 g/ 50mL Intravenous Solution 25 g 25 g Every 15 Minutes PRN 10/22/2019     Sig - Route: Infuse 50 mL into a venous catheter Every 15 (Fifteen) Minutes As Needed for Low Blood Sugar (Blood Sugar Less Than 70). - Intravenous    dextrose (GLUTOSE) oral gel 15 g 15 g Every 15 Minutes PRN 10/22/2019     Sig - Route: Take 15 application by mouth Every 15 (Fifteen) Minutes As Needed for Low Blood Sugar (Blood sugar less than 70). - Oral    gadoteridol (PROHANCE) injection 20 mL 20 mL Once in Imaging 10/23/2019 10/23/2019    Sig - Route: Infuse 20 mL into a venous catheter Once. - Intravenous    glucagon (human recombinant) (GLUCAGEN DIAGNOSTIC) injection 1 mg 1 mg Every 15 Minutes PRN 10/22/2019     Sig - Route: Inject 1 mg under the skin into the appropriate area as directed Every 15 (Fifteen) Minutes As Needed for Low Blood Sugar (Blood Glucose Less Than 70). - Subcutaneous    influenza vac  split quad (FLUZONE,FLUARIX,AFLURIA) injection 0.5 mL 0.5 mL During Hospitalization 10/23/2019     Sig - Route: Inject 0.5 mL into the appropriate muscle as directed by prescriber During Hospitalization for Immunization. - Intramuscular    insulin lispro (humaLOG) injection 0-7 Units 0-7 Units Every 6 Hours Scheduled 10/23/2019     Sig - Route: Inject 0-7 Units under the skin into the appropriate area as directed Every 6 (Six) Hours. - Subcutaneous    iopamidol (ISOVUE-370) 76 % injection 100 mL 100 mL Once in Imaging 10/24/2019 10/24/2019    Sig - Route: Infuse 100 mL into a venous catheter Once. - Intravenous    levETIRAcetam in NaCl 0.82% (KEPPRA) IVPB 500 mg 500 mg Every 12 Hours 10/23/2019     Sig - Route: Infuse 100 mL into a venous catheter Every 12 (Twelve) Hours. - Intravenous    sodium chloride 0.9 % flush 10 mL 10 mL As Needed 10/22/2019     Sig - Route: Infuse 10 mL into a venous catheter As Needed for Line Care. - Intravenous    sodium chloride 0.9 % flush 10 mL 10 mL Every 12 Hours Scheduled 10/22/2019     Sig - Route: Infuse 10 mL into a venous catheter Every 12 (Twelve) Hours. - Intravenous    sodium chloride 0.9 % flush 10 mL 10 mL As Needed 10/22/2019     Sig - Route: Infuse 10 mL into a venous catheter As Needed for Line Care. - Intravenous    famotidine (PEPCID) injection 20 mg (Discontinued) 20 mg Every 12 Hours Scheduled 10/22/2019 10/24/2019    Sig - Route: Infuse 2 mL into a venous catheter Every 12 (Twelve) Hours. 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 male    Current Problem List:   Patient Active Problem List   Diagnosis   • Chest pain, atypical   • Dizziness   • Near syncope   • Memory loss due to medical condition   • Brain mass   • Altered mental state           Current Medications:  Scheduled Meds:  dexamethasone 8 mg Intravenous Q12H   insulin lispro 0-7 Units Subcutaneous Q6H   [COMPLETED] iopamidol 100 mL Intravenous Once in imaging   levETIRAcetam 500 mg Intravenous Q12H   sodium chloride 10 mL Intravenous Q12H     Continuous Infusions:   PRN Meds:.dextrose  •  dextrose  •  glucagon (human recombinant)  •  influenza vaccine  •  sodium chloride  •  sodium chloride      Diagnostic Tests:    Lab Results (last 24 hours)     Procedure Component Value Units Date/Time    POC Glucose Once [[id number]]  (Abnormal) Collected:  10/24/19 1207    Specimen:  Blood Updated:  10/24/19 1218     Glucose 137 mg/dL      Comment: Serial Number: [id number]:  [id number]       Basic Metabolic Panel [[id number]]  (Abnormal) Collected:  10/24/19 0404    Specimen:  Blood Updated:  10/24/19 0730     Glucose 159 mg/dL      BUN 19 mg/dL      Creatinine 0.72 mg/dL      Sodium 141 mmol/L      Potassium 3.8 mmol/L      Chloride 104 mmol/L      CO2 24.0 mmol/L      Calcium 9.4 mg/dL      eGFR Non African Amer 104 mL/min/1.73      BUN/Creatinine Ratio 26.4     Anion Gap 13.0 mmol/L     Narrative:       GFR Normal >60  Chronic Kidney Disease <60  Kidney Failure <15    Magnesium [[id number]]  (Normal) Collected:  10/24/19 0404    Specimen:  Blood Updated:  10/24/19 0721     Magnesium 1.9 mg/dL     CBC & Differential [[id number]] Collected:  10/24/19 0404    Specimen:  Blood Updated:  10/24/19 0547    Narrative:       The following orders were created for panel order CBC & Differential.  Procedure                               Abnormality         Status                     ---------                               -----------         ------                      CBC Auto Differential[[id number]]        Abnormal            Final result                 Please view results for these tests on the individual orders.    CBC Auto Differential [[id number]]  (Abnormal) Collected:  10/24/19 0404    Specimen:  Blood Updated:  10/24/19 0547     WBC 14.20 10*3/mm3      Comment: Result checked         RBC 4.08 10*6/mm3      Hemoglobin 13.3 g/dL      Hematocrit 39.5 %      MCV 96.9 fL      MCH 32.7 pg      MCHC 33.8 g/dL      RDW 13.7 %      RDW-SD 46.4 fl      MPV 9.5 fL      Platelets 210 10*3/mm3      Neutrophil % 90.1 %      Lymphocyte % 7.3 %      Monocyte % 2.6 %      Eosinophil % 0.0 %      Basophil % 0.0 %      Neutrophils, Absolute 12.80 10*3/mm3      Lymphocytes, Absolute 1.00 10*3/mm3      Monocytes, Absolute 0.40 10*3/mm3      Eosinophils, Absolute 0.00 10*3/mm3      Basophils, Absolute 0.00 10*3/mm3      nRBC 0.0 /100 WBC     POC Glucose Once [[id number]]  (Abnormal) Collected:  10/24/19 0532    Specimen:  Blood Updated:  10/24/19 0534     Glucose 135 mg/dL      Comment: Serial Number: [id number]:  [id number]       POC Glucose Once [[id number]]  (Abnormal) Collected:  10/23/19 2336    Specimen:  Blood Updated:  10/23/19 2349     Glucose 153 mg/dL      Comment: Serial Number: [id number]:  [id number]       POC Glucose Once [[id number]]  (Abnormal) Collected:  10/23/19 2021    Specimen:  Blood Updated:  10/23/19 2022     Glucose 191 mg/dL      Comment: Serial Number: [id number]:  [id number]       POC Glucose Once [[id number]]  (Abnormal) Collected:  10/23/19 1850    Specimen:  Blood Updated:  10/23/19 1852     Glucose 188 mg/dL      Comment: Serial Number: [id number]:  [id number]       Urinalysis With Culture If Indicated - Urine, Random Void [[id number]]  (Abnormal) Collected:  10/23/19 1812    Specimen:  Urine, Random Void Updated:  10/23/19 1829     Color, UA Yellow     Appearance, UA Clear     pH, UA 6.0     Specific Gravity, UA 1.029     Glucose, UA  Negative     Ketones, UA Trace     Bilirubin, UA Negative     Blood, UA Negative     Protein, UA Negative     Leuk Esterase, UA Negative     Nitrite, UA Negative     Urobilinogen, UA 0.2 E.U./dL    Narrative:       Urine microscopic not indicated.    Vitamin B12 [[id number]]  (Abnormal) Collected:  10/23/19 1127    Specimen:  Blood Updated:  10/23/19 1633     Vitamin B-12 180 pg/mL     POC Glucose Once [[id number]]  (Abnormal) Collected:  10/23/19 1402    Specimen:  Blood Updated:  10/23/19 1403     Glucose 150 mg/dL      Comment: Serial Number: [id number]:  [id number]       POC Glucose Once [[id number]]  (Abnormal) Collected:  10/23/19 1246    Specimen:  Blood Updated:  10/23/19 1247     Glucose 154 mg/dL      Comment: Serial Number: [id number]:  [id number]             Imaging Results (last 24 hours)     Procedure Component Value Units Date/Time    CT Abdomen Pelvis With Contrast [[id number]] Updated:  10/24/19 1156    CT Chest With Contrast [[id number]] Updated:  10/24/19 1156    MRI Brain With & Without Contrast [[id number]] Collected:  10/23/19 1349     Updated:  10/23/19 1407    Narrative:          DATE OF EXAM:   10/23/2019 1:00 PM     PROCEDURE:   MRI BRAIN W WO CONTRAST-     INDICATIONS:   MASS OR LUMP, HEAD; R 41.3 OTHER AMNESIA; D 49.6 NEOPLASM OF UNSPECIFIED  BEHAVIOR OF BRAIN. HISTORY OF PROSTATE CARCINOMA.     COMPARISON:  Brain MRI examination, 10/24/2018. Head CT examination, 10/22/2019.     TECHNIQUE:   Routine magnetic resonance imaging was obtained of the brain before and  after the administration of 20 mL of ProHance contrast intravenously.     FINDINGS:   This study is abnormal. Abnormal enhancing masses are seen in the  bilateral frontal lobes and cross the midline extending via the enlarged  genu of the corpus callosum.  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 Multicentric or multifocal glioblastoma cannot  be excluded. Gliomatosis cerebri is possible. Other  differential  possibilities would include lymphoma. An inflammatory/infectious  process, such as tumefactive multiple sclerosis or toxoplasmosis are  thought to be unlikely. Prostatic metastases are thought to be unlikely,  as well. There is a large amount of surrounding vasogenic edema (versus  tumor edema).  The edema is greater on the left than the right and  measures approximately 9.3 x 9.3 cm in greatest transverse and AP  extent, respectively. It measures approximately 7.8 cm in greatest  craniocaudal dimension within the left frontal lobe. There is associated  deformity of the anterior lateral ventricles bilaterally. There is  approximately 8 mm of left-to-right subfalcine herniation. No  uncal-parahippocampal herniation is seen. No other acute intracranial  herniation syndrome is suggested. There is diffuse left-sided cerebral  sulcal effacement. No definite abnormal intraventricular enhancement is  seen. No leptomeningeal enhancement abnormality is suspected. The  largest measured single contiguous enhancing lesion is centered in the  midline at the genu of the corpus callosum and measures approximately  5.5 x 4.3 x 2.7 cm in transverse, AP, and craniocaudal extent,  respectively, as seen on image 109 of series 10 and image 11 of series  13.        Impression:       This study is abnormal. There are enhancing abnormalities in the  bilateral frontal lobes, which extend across the genu of the corpus  callosum and expand the corpus callosum anteriorly. It likely represents  a high-grade multifocal or multicentric glioblastoma. Lymphoma cannot be  excluded but is thought to be less likely. There is extensive  surrounding vasogenic or tumor edema with associated mass effect. There  is approximately 8 mm of left-to-right subfalcine herniation associated  with the findings. No definite acute intracranial hemorrhage is seen. No  definite acute infarct is identified. There is associated deformity of  the lateral  ventricles, greater on the left, especially involving the  anterior horns of the lateral ventricles.     Electronically Signed By-Dr. Derick Kiser MD On:10/23/2019 2:05 PM  This report was finalized on [id number] by Dr. Derick Kiser MD.          Vitals:    10/24/19 0800 10/24/19 0825 10/24/19 1050 10/24/19 1120   BP:  154/76 125/63 116/50   Pulse:  84 64 64   Resp:       Temp: 98.3 °F (36.8 °C)      TempSrc:       SpO2:  96% 96% 94%   Weight:       Height:             Plan:   Dr Mesa had a long discussion with the family regarding the MRI findings and the patients prognosis, both with and without treatment and resection. 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 Family does not wish to pursue treatment or a biopsy. We agree with the family's decision and recommend palliative care and a hospice consult.       Date: 10/24/2019    Sarah Wheeler PA-C  12:32 PM      Electronically signed by Sarah Wheeler PA-C at 10/24/19 1307          Consult Notes (most recent note)      Drew Rojo MD at 10/23/19 1630          Hematology/Oncology Inpatient Consultation    Patient name: Bruce Manzo  : 1935  MRN: [id number]  Referring Provider: JACKIE Wheeler PA-C  Reason for Consultation: brain mass    Chief complaint: confusion    History of present illness:    84 y.o. male who was admitted thru the ER on 10/22/19 reporting an acute onset of short term memory loss.  He had gotten into his car and then sat there and did not know why he was there.  His family had noticed some unusual behavior over the past week-shaving over part of his face at a time.  CT head was abnormal and a brain MRI confirmed a 7.8 cm mass with extensive edema in the frontal lobes crossing the corpus callosum with a a left to right shift.  He was started on dexamethasone and neurosurgery was consulted.  He had a h/o CVA and neurology was consulted.  CXR was neg. And laboratory evaluation  was unremarkable.   His wife reports h/o a skin cancer excision from his left neck in May 2019.  He has a h/o prostate cancer more than 5 years ago, treated with XRT and radiation beads.  He is seen annually by urology and has never recurred.      10/23/19  Hematology/Oncology was consulted for his brain mass.      PCP: Rohit Goodman MD    History:  Past Medical History:   Diagnosis Date   • CVA (cerebral vascular accident) (CMS/HCC)    • Hypertension    • Prostate cancer (CMS/HCC)     Currently in radiation therapy    , Past Surgical History:   Procedure Laterality Date   • CARDIAC CATHETERIZATION  03/30/2015   • SKIN CANCER EXCISION      Neck    , Family History   Problem Relation Age of Onset   • Colon cancer Mother    • Stroke Father    • Lung cancer Brother    , Social History     Tobacco Use   • Smoking status: Never Smoker   Substance Use Topics   • Alcohol use: No     Frequency: Never   • Drug use: No   , Medications Prior to Admission   Medication Sig Dispense Refill Last Dose   • aspirin (ASPIR-LOW) 81 MG EC tablet Take 81 mg by mouth Daily.   Taking   • atenolol (TENORMIN) 25 MG tablet Take 25 mg by mouth Daily.   Taking   • pravastatin (PRAVACHOL) 20 MG tablet Take 20 mg by mouth Daily.   Taking   • sertraline (ZOLOFT) 25 MG tablet Take 25 mg by mouth Daily.   Taking   , Scheduled Meds:    dexamethasone 8 mg Intravenous Q12H   famotidine 20 mg Intravenous Q12H   insulin lispro 0-7 Units Subcutaneous Q6H   levETIRAcetam 500 mg Intravenous Q12H   sodium chloride 10 mL Intravenous Q12H   , Continuous Infusions:   , PRN Meds:  dextrose  •  dextrose  •  glucagon (human recombinant)  •  influenza vaccine  •  sodium chloride  •  sodium chloride   Allergies:  Patient has no known allergies.    ROS:  Review of Systems   Constitutional: Negative for diaphoresis, fatigue, fever and unexpected weight change.   HENT: Negative for congestion and nosebleeds.    Eyes: Negative.    Respiratory: Negative for cough and  "shortness of breath.    Cardiovascular: Negative for chest pain and leg swelling.   Gastrointestinal: Negative for abdominal pain, blood in stool, constipation, diarrhea, nausea and vomiting.   Endocrine: Negative for cold intolerance and heat intolerance.   Genitourinary: Negative for dysuria and hematuria.   Musculoskeletal: Negative for arthralgias and joint swelling.   Skin: Negative for rash and wound.   Neurological: Negative for numbness and headaches.        Memory problems.     Hematological: Does not bruise/bleed easily.   Psychiatric/Behavioral: Positive for behavioral problems and confusion. The patient is not nervous/anxious.    All other systems reviewed and are negative.       Objective     Vital Signs:   /71   Pulse 77   Temp 97.7 °F (36.5 °C) (Oral)   Resp 18   Ht 190.5 cm (75\")   Wt 122 kg (268 lb 11.9 oz)   SpO2 93%   BMI 33.59 kg/m²      Physical Exam:  Physical Exam   Constitutional: He appears well-developed and well-nourished.   Healthy appearing with multiple family members present. Obese.    HENT:   Head: Normocephalic and atraumatic.   Mouth/Throat: Oropharynx is clear and moist.   Eyes: Conjunctivae, EOM and lids are normal. Pupils are equal, round, and reactive to light.   Neck: Normal range of motion. Neck supple. No thyromegaly present.   Cardiovascular: Normal rate, regular rhythm and normal heart sounds.   No murmur heard.  Pulmonary/Chest: Effort normal and breath sounds normal. No respiratory distress.   Abdominal: Soft. Normal appearance and bowel sounds are normal. He exhibits no distension.   Genitourinary:   Genitourinary Comments: Deferred.   Musculoskeletal: Normal range of motion. He exhibits no edema.   Lymphadenopathy:     He has no cervical adenopathy.     He has no axillary adenopathy.        Right: No supraclavicular adenopathy present.        Left: No supraclavicular adenopathy present.   Neurological: He is alert.   Unable to state how many children he has " or which hospital he is in. He knows his family.    Skin: Skin is warm and dry. Capillary refill takes less than 2 seconds. No bruising, no petechiae and no rash noted.   Psychiatric: He has a normal mood and affect. His speech is normal and behavior is normal. Judgment and thought content normal. Cognition and memory are normal.   Nursing note and vitals reviewed.       Results Review:  Lab Results (last 48 hours)     Procedure Component Value Units Date/Time    POC Glucose Once [[id number]]  (Abnormal) Collected:  10/23/19 1402    Specimen:  Blood Updated:  10/23/19 1403     Glucose 150 mg/dL      Comment: Serial Number: [id number]:  [id number]       POC Glucose Once [[id number]]  (Abnormal) Collected:  10/23/19 1246    Specimen:  Blood Updated:  10/23/19 1247     Glucose 154 mg/dL      Comment: Serial Number: [id number]:  [id number]       Vitamin B12 [[id number]] Collected:  10/23/19 1127    Specimen:  Blood Updated:  10/23/19 1145    Hemoglobin A1c [[id number]]  (Normal) Collected:  10/23/19 0403    Specimen:  Blood Updated:  10/23/19 1059     Hemoglobin A1C 5.3 %     Narrative:       Hemoglobin A1C Reference Range:    <5.7 %        Normal  5.7-6.4 %     Increased risk for diabetes  > 6.4 %        Diabetes       These guidelines have been recommended by the American Diabetic Association for Hgb A1c.      The following 2010 guidelines have been recommended by the American Diabetes Association for Hemoglobin A1c.    HBA1c 5.7-6.4% Increased risk for future diabetes (pre-diabetes)  HBA1c     >6.4% Diabetes    TSH [[id number]]  (Normal) Collected:  10/22/19 2055    Specimen:  Blood from Arm, Right Updated:  10/23/19 1037     TSH 3.120 uIU/mL     Lipid Panel [[id number]]  (Abnormal) Collected:  10/22/19 2055    Specimen:  Blood from Arm, Right Updated:  10/23/19 1030     Total Cholesterol 81 mg/dL      Triglycerides 158 mg/dL      HDL Cholesterol 24 mg/dL      LDL Cholesterol  25 mg/dL      VLDL Cholesterol 31.6  mg/dL      LDL/HDL Ratio 1.06    Narrative:       Cholesterol Reference Ranges  (U.S. Department of Health and Human Services ATP III Classifications)    Desirable          <200 mg/dL  Borderline High    200-239 mg/dL  High Risk          >240 mg/dL      Triglyceride Reference Ranges  (U.S. Department of Health and Human Services ATP III Classifications)    Normal           <150 mg/dL  Borderline High  150-199 mg/dL  High             200-499 mg/dL  Very High        >500 mg/dL    HDL Reference Ranges  (U.S. Department of Health and Human Services ATP III Classifcations)    Low     <40 mg/dl (major risk factor for CHD)  High    >60 mg/dl ('negative' risk factor for CHD)        LDL Reference Ranges  (U.S. Department of Health and Human Services ATP III Classifcations)    Optimal          <100 mg/dL  Near Optimal     100-129 mg/dL  Borderline High  130-159 mg/dL  High             160-189 mg/dL  Very High        >189 mg/dL    CBC & Differential [[id number]] Collected:  10/23/19 0403    Specimen:  Blood Updated:  10/23/19 0441    Narrative:       The following orders were created for panel order CBC & Differential.  Procedure                               Abnormality         Status                     ---------                               -----------         ------                     CBC Auto Differential[[id number]]        Abnormal            Final result                 Please view results for these tests on the individual orders.    CBC Auto Differential [[id number]]  (Abnormal) Collected:  10/23/19 0403    Specimen:  Blood Updated:  10/23/19 0441     WBC 7.50 10*3/mm3      RBC 4.20 10*6/mm3      Hemoglobin 14.0 g/dL      Hematocrit 40.4 %      MCV 96.2 fL      MCH 33.4 pg      MCHC 34.7 g/dL      RDW 13.6 %      RDW-SD 45.9 fl      MPV 8.7 fL      Platelets 187 10*3/mm3      Neutrophil % 84.6 %      Lymphocyte % 14.1 %      Monocyte % 1.0 %      Eosinophil % 0.1 %      Basophil % 0.2 %      Neutrophils, Absolute 6.30  10*3/mm3      Lymphocytes, Absolute 1.10 10*3/mm3      Monocytes, Absolute 0.10 10*3/mm3      Eosinophils, Absolute 0.00 10*3/mm3      Basophils, Absolute 0.00 10*3/mm3      nRBC 0.1 /100 WBC     Magnesium [[id number]]  (Normal) Collected:  10/22/19 2055    Specimen:  Blood from Arm, Right Updated:  10/23/19 0314     Magnesium 1.9 mg/dL     MRSA Screen Culture - Swab, Nares [[id number]] Collected:  10/23/19 0111    Specimen:  Swab from Nares Updated:  10/23/19 0114    POC Glucose Once [[id number]]  (Abnormal) Collected:  10/22/19 2339    Specimen:  Blood Updated:  10/22/19 2340     Glucose 111 mg/dL      Comment: Serial Number: [id number]:  [id number]       [place] Draw [[id number]] Collected:  10/22/19 2013    Specimen:  Blood Updated:  10/22/19 2213    Narrative:       The following orders were created for panel order [place] Draw.  Procedure                               Abnormality         Status                     ---------                               -----------         ------                     Light Blue Top[[id number]]                                   Final result               Green Top (Gel)[[id number]]                                  Final result               Lavender Top[[id number]]                                     Final result               Gold Top - SST[[id number]]                                   Final result                 Please view results for these tests on the individual orders.    Light Blue Top [[id number]] Collected:  10/22/19 2013    Specimen:  Blood from Arm, Left Updated:  10/22/19 2213     Extra Tube hold for add-on     Comment: Auto resulted       Green Top (Gel) [[id number]] Collected:  10/22/19 2013    Specimen:  Blood from Arm, Left Updated:  10/22/19 2213     Extra Tube Hold for add-ons.     Comment: Auto resulted.       Lavender Top [[id number]] Collected:  10/22/19 2013    Specimen:  Blood from Arm, Left Updated:  10/22/19 2213     Extra Tube hold for add-on     Comment:  Auto resulted       Gold Top - SST [[id number]] Collected:  10/22/19 2013    Specimen:  Blood from Arm, Left Updated:  10/22/19 2213     Extra Tube Hold for add-ons.     Comment: Auto resulted.       Comprehensive Metabolic Panel [[id number]]  (Abnormal) Collected:  10/22/19 2055    Specimen:  Blood from Arm, Right Updated:  10/22/19 2121     Glucose 155 mg/dL      BUN 15 mg/dL      Creatinine 0.72 mg/dL      Sodium 145 mmol/L      Potassium 4.0 mmol/L      Chloride 107 mmol/L      CO2 27.0 mmol/L      Calcium 8.7 mg/dL      Total Protein 6.2 g/dL      Albumin 4.10 g/dL      ALT (SGPT) 17 U/L      AST (SGOT) 17 U/L      Alkaline Phosphatase 58 U/L      Total Bilirubin 0.4 mg/dL      eGFR Non African Amer 104 mL/min/1.73      Globulin 2.1 gm/dL      A/G Ratio 2.0 g/dL      BUN/Creatinine Ratio 20.8     Anion Gap 11.0 mmol/L     Narrative:       GFR Normal >60  Chronic Kidney Disease <60  Kidney Failure <15    Protime-INR [[id number]]  (Normal) Collected:  10/22/19 2013    Specimen:  Blood from Arm, Left Updated:  10/22/19 2046     Protime 10.6 Seconds      INR 1.02    aPTT [[id number]]  (Abnormal) Collected:  10/22/19 2013    Specimen:  Blood from Arm, Left Updated:  10/22/19 2046     PTT 20.9 seconds     Troponin [[id number]]  (Normal) Collected:  10/22/19 2013    Specimen:  Blood from Arm, Left Updated:  10/22/19 2044     Troponin T <0.010 ng/mL     Narrative:       Troponin T Reference Range:  <= 0.03 ng/mL-   Negative for AMI  >0.03 ng/mL-     Abnormal for myocardial necrosis.  Clinicians would have to utilize clinical acumen, EKG, Troponin and serial changes to determine if it is an Acute Myocardial Infarction or myocardial injury due to an underlying chronic condition.     CBC & Differential [[id number]] Collected:  10/22/19 2013    Specimen:  Blood Updated:  10/22/19 2020    Narrative:       The following orders were created for panel order CBC & Differential.  Procedure                               Abnormality          Status                     ---------                               -----------         ------                     CBC Auto Differential[[id number]]        Abnormal            Final result                 Please view results for these tests on the individual orders.    CBC Auto Differential [[id number]]  (Abnormal) Collected:  10/22/19 2013    Specimen:  Blood from Arm, Left Updated:  10/22/19 2020     WBC 8.00 10*3/mm3      RBC 4.43 10*6/mm3      Hemoglobin 14.7 g/dL      Hematocrit 42.9 %      MCV 97.0 fL      MCH 33.1 pg      MCHC 34.2 g/dL      RDW 13.5 %      RDW-SD 45.9 fl      MPV 8.6 fL      Platelets 215 10*3/mm3      Neutrophil % 67.4 %      Lymphocyte % 21.8 %      Monocyte % 7.9 %      Eosinophil % 1.9 %      Basophil % 1.0 %      Neutrophils, Absolute 5.40 10*3/mm3      Lymphocytes, Absolute 1.70 10*3/mm3      Monocytes, Absolute 0.60 10*3/mm3      Eosinophils, Absolute 0.20 10*3/mm3      Basophils, Absolute 0.10 10*3/mm3      nRBC 0.0 /100 WBC     POC Glucose Once [[id number]]  (Abnormal) Collected:  10/22/19 2009    Specimen:  Blood Updated:  10/22/19 2010     Glucose 124 mg/dL      Comment: Serial Number: [id number]:  [id number]              Pending Results: CT C/A/P, path from Dr. Fair.    Imaging Reviewed:   Mri Brain With & Without Contrast    Result Date: 10/23/2019  This study is abnormal. There are enhancing abnormalities in the bilateral frontal lobes, which extend across the genu of the corpus callosum and expand the corpus callosum anteriorly. 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 Lymphoma cannot be excluded but is thought to be less likely. There is extensive surrounding vasogenic or tumor edema with associated mass effect. There is approximately 8 mm of left-to-right subfalcine herniation associated with the findings. No definite acute intracranial hemorrhage is seen. No definite acute infarct is identified. There is associated deformity of the  lateral ventricles, greater on the left, especially involving the anterior horns of the lateral ventricles.  Electronically Signed By-Dr. Derick Kiser MD On:10/23/2019 2:05 PM This report was finalized on [id number] by Dr. Derick Kiser MD.    Xr Chest 1 View    Result Date: 10/22/2019  No active disease.  Electronically Signed By-Rohit Villa On:10/22/2019 9:03 PM This report was finalized on [id number] by  Rohit Villa, .    Ct Head Without Contrast Stroke Protocol    Result Date: 10/22/2019  IMPRESSION :  1. There is a hyperdense mass located within the frontal lobes which crosses midline through the corpus callosum. There appear to be additional hyperdense masses in the left corona radiata. This is concerning for a GBM versus CNS lymphoma. I would recommend an MRI of the brain with and without contrast for follow-up. The findings were discussed with Dr. Yoseph Vincent M.D. by telephone. 2. There is vasogenic edema which is most marked in the left frontal lobe. I cannot completely exclude encephalomalacia in the left frontal lobe. 3. There is 7 mm a left to right subfalcine shift. There is no transtentorial herniation. 4. Questionable old lacunar infarct in the left aspect of the virgilio.  Electronically Signed By-Rohit Villa On:10/22/2019 8:31 PM This report was finalized on [id number] by  Rohit Villa, .      I have reviewed the patient's labs, imaging, reports, and other clinician documentation.        Assessment/Plan       ASSESSMENT  1. Frontal brain mass-Neurosurgery consulted. On dexamethasone, pepcid and keppra.  Body CT's pending.  H/O skin cancer removed in 5/2019-will obtain path.  Concern for either a GBM or CNS lymphoma.    2. H/O prostate cancer-diagnosed > 5 yr ago and has never recurred.  UTD on f/u.    3. H/O left neck skin cancer-will obtain path as melanoma skin cancers can spread to the brain.  4. HTN/H/O CVA- ASA on hold for possible brain biopsy.     PLAN  1. Await CT's  2. Will need  tissue diagnosis.  3. Obtain path from Dr. Fair's office-our office will obtain.     Electronically signed by KARRI Khan, 10/23/19, 4:47 PM.    I have personally performed a face-to-face diagnostic evaluation on this patient.  I have discussed the case with Cordelia Valencia NP, have edited/reviewed the note,  and agree with the care plan.  The patient is feeling sleepy and on examination is moving all extremities equally.  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  Have had prolonged discussion with family regarding options and they will make up their mind on how to proceed soon.      I discussed the patients findings and my recommendations with patient and family.    Thank you for this consult.  We will be happy to follow along in the care of this patient.     KARRI Salmeron  10/23/19  4:30 PM    Much of the above report is an electronic transcription/translation of the spoken language to printed text using Dragon Software. As such, the subtleties and finesse of the spoken language may permit erroneous, or at times, nonsensical words or phrases to be inadvertently transcribed; thus changes may be made at a later date to rectify these errors.        Electronically signed by Drew Rojo MD at 10/23/19 5808

## 2019-10-24 NOTE — PROGRESS NOTES
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 male    Current Problem List:   Patient Active Problem List   Diagnosis   • Chest pain, atypical   • Dizziness   • Near syncope   • Memory loss due to medical condition   • Brain mass   • Altered mental state           Current Medications:  Scheduled Meds:  dexamethasone 8 mg Intravenous Q12H   insulin lispro 0-7 Units Subcutaneous Q6H   [COMPLETED] iopamidol 100 mL Intravenous Once in imaging   levETIRAcetam 500 mg Intravenous Q12H   sodium chloride 10 mL Intravenous Q12H     Continuous Infusions:   PRN Meds:.dextrose  •  dextrose  •  glucagon (human recombinant)  •  influenza vaccine  •  sodium chloride  •  sodium chloride      Diagnostic Tests:    Lab Results (last 24 hours)     Procedure Component Value Units Date/Time    POC Glucose Once [[id number]]  (Abnormal) Collected:  10/24/19 1207    Specimen:  Blood Updated:  10/24/19 1218     Glucose 137 mg/dL      Comment: Serial Number: [id number]:  [id number]       Basic Metabolic Panel [[id number]]  (Abnormal) Collected:  10/24/19 0404    Specimen:  Blood Updated:  10/24/19 0730     Glucose 159 mg/dL      BUN 19 mg/dL      Creatinine 0.72 mg/dL      Sodium 141 mmol/L      Potassium 3.8 mmol/L      Chloride 104 mmol/L      CO2 24.0 mmol/L      Calcium 9.4 mg/dL      eGFR Non African Amer 104 mL/min/1.73      BUN/Creatinine Ratio 26.4     Anion Gap 13.0 mmol/L     Narrative:       GFR Normal >60  Chronic Kidney Disease <60  Kidney Failure <15    Magnesium [[id number]]  (Normal) Collected:  10/24/19 0404    Specimen:  Blood Updated:  10/24/19 0721     Magnesium 1.9 mg/dL     CBC &  Differential [[id number]] Collected:  10/24/19 0404    Specimen:  Blood Updated:  10/24/19 0547    Narrative:       The following orders were created for panel order CBC & Differential.  Procedure                               Abnormality         Status                     ---------                               -----------         ------                     CBC Auto Differential[[id number]]        Abnormal            Final result                 Please view results for these tests on the individual orders.    CBC Auto Differential [[id number]]  (Abnormal) Collected:  10/24/19 0404    Specimen:  Blood Updated:  10/24/19 0547     WBC 14.20 10*3/mm3      Comment: Result checked         RBC 4.08 10*6/mm3      Hemoglobin 13.3 g/dL      Hematocrit 39.5 %      MCV 96.9 fL      MCH 32.7 pg      MCHC 33.8 g/dL      RDW 13.7 %      RDW-SD 46.4 fl      MPV 9.5 fL      Platelets 210 10*3/mm3      Neutrophil % 90.1 %      Lymphocyte % 7.3 %      Monocyte % 2.6 %      Eosinophil % 0.0 %      Basophil % 0.0 %      Neutrophils, Absolute 12.80 10*3/mm3      Lymphocytes, Absolute 1.00 10*3/mm3      Monocytes, Absolute 0.40 10*3/mm3      Eosinophils, Absolute 0.00 10*3/mm3      Basophils, Absolute 0.00 10*3/mm3      nRBC 0.0 /100 WBC     POC Glucose Once [[id number]]  (Abnormal) Collected:  10/24/19 0532    Specimen:  Blood Updated:  10/24/19 0534     Glucose 135 mg/dL      Comment: Serial Number: [id number]:  [id number]       POC Glucose Once [[id number]]  (Abnormal) Collected:  10/23/19 2336    Specimen:  Blood Updated:  10/23/19 2349     Glucose 153 mg/dL      Comment: Serial Number: [id number]:  [id number]       POC Glucose Once [[id number]]  (Abnormal) Collected:  10/23/19 2021    Specimen:  Blood Updated:  10/23/19 2022     Glucose 191 mg/dL      Comment: Serial Number: [id number]:  [id number]       POC Glucose Once [[id number]]  (Abnormal) Collected:  10/23/19 1850    Specimen:  Blood Updated:  10/23/19 1852      Glucose 188 mg/dL      Comment: Serial Number: [id number]:  [id number]       Urinalysis With Culture If Indicated - Urine, Random Void [[id number]]  (Abnormal) Collected:  10/23/19 1812    Specimen:  Urine, Random Void Updated:  10/23/19 1829     Color, UA Yellow     Appearance, UA Clear     pH, UA 6.0     Specific Gravity, UA 1.029     Glucose, UA Negative     Ketones, UA Trace     Bilirubin, UA Negative     Blood, UA Negative     Protein, UA Negative     Leuk Esterase, UA Negative     Nitrite, UA Negative     Urobilinogen, UA 0.2 E.U./dL    Narrative:       Urine microscopic not indicated.    Vitamin B12 [[id number]]  (Abnormal) Collected:  10/23/19 1127    Specimen:  Blood Updated:  10/23/19 1633     Vitamin B-12 180 pg/mL     POC Glucose Once [[id number]]  (Abnormal) Collected:  10/23/19 1402    Specimen:  Blood Updated:  10/23/19 1403     Glucose 150 mg/dL      Comment: Serial Number: [id number]:  [id number]       POC Glucose Once [[id number]]  (Abnormal) Collected:  10/23/19 1246    Specimen:  Blood Updated:  10/23/19 1247     Glucose 154 mg/dL      Comment: Serial Number: [id number]:  [id number]             Imaging Results (last 24 hours)     Procedure Component Value Units Date/Time    CT Abdomen Pelvis With Contrast [[id number]] Updated:  10/24/19 1156    CT Chest With Contrast [[id number]] Updated:  10/24/19 1156    MRI Brain With & Without Contrast [[id number]] Collected:  10/23/19 1349     Updated:  10/23/19 1407    Narrative:          DATE OF EXAM:   10/23/2019 1:00 PM     PROCEDURE:   MRI BRAIN W WO CONTRAST-     INDICATIONS:   MASS OR LUMP, HEAD; R 41.3 OTHER AMNESIA; D 49.6 NEOPLASM OF UNSPECIFIED  BEHAVIOR OF BRAIN. HISTORY OF PROSTATE CARCINOMA.     COMPARISON:  Brain MRI examination, 10/24/2018. Head CT examination, 10/22/2019.     TECHNIQUE:   Routine magnetic resonance imaging was obtained of the brain before and  after the administration of 20 mL of ProHance contrast intravenously.      FINDINGS:   This study is abnormal. Abnormal enhancing masses are seen in the  bilateral frontal lobes and cross the midline extending via the enlarged  genu of the corpus callosum.  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 Multicentric or multifocal glioblastoma cannot  be excluded. Gliomatosis cerebri is possible. Other differential  possibilities would include lymphoma. An inflammatory/infectious  process, such as tumefactive multiple sclerosis or toxoplasmosis are  thought to be unlikely. Prostatic metastases are thought to be unlikely,  as well. There is a large amount of surrounding vasogenic edema (versus  tumor edema).  The edema is greater on the left than the right and  measures approximately 9.3 x 9.3 cm in greatest transverse and AP  extent, respectively. It measures approximately 7.8 cm in greatest  craniocaudal dimension within the left frontal lobe. There is associated  deformity of the anterior lateral ventricles bilaterally. There is  approximately 8 mm of left-to-right subfalcine herniation. No  uncal-parahippocampal herniation is seen. No other acute intracranial  herniation syndrome is suggested. There is diffuse left-sided cerebral  sulcal effacement. No definite abnormal intraventricular enhancement is  seen. No leptomeningeal enhancement abnormality is suspected. The  largest measured single contiguous enhancing lesion is centered in the  midline at the genu of the corpus callosum and measures approximately  5.5 x 4.3 x 2.7 cm in transverse, AP, and craniocaudal extent,  respectively, as seen on image 109 of series 10 and image 11 of series  13.        Impression:       This study is abnormal. There are enhancing abnormalities in the  bilateral frontal lobes, which extend across the genu of the corpus  callosum and expand the corpus callosum anteriorly. It likely represents  a high-grade multifocal or multicentric glioblastoma. Lymphoma cannot be  excluded but is  thought to be less likely. There is extensive  surrounding vasogenic or tumor edema with associated mass effect. There  is approximately 8 mm of left-to-right subfalcine herniation associated  with the findings. No definite acute intracranial hemorrhage is seen. No  definite acute infarct is identified. There is associated deformity of  the lateral ventricles, greater on the left, especially involving the  anterior horns of the lateral ventricles.     Electronically Signed By-Dr. Derick Kiser MD On:10/23/2019 2:05 PM  This report was finalized on [id number] by Dr. Derick Kiser MD.          Vitals:    10/24/19 0800 10/24/19 0825 10/24/19 1050 10/24/19 1120   BP:  154/76 125/63 116/50   Pulse:  84 64 64   Resp:       Temp: 98.3 °F (36.8 °C)      TempSrc:       SpO2:  96% 96% 94%   Weight:       Height:             Plan:   Dr Mesa had a long discussion with the family regarding the MRI findings and the patients prognosis, both with and without treatment and resection. 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 Family does not wish to pursue treatment or a biopsy. We agree with the family's decision and recommend palliative care and a hospice consult.       Date: 10/24/2019    Sarah Wheeler PA-C  12:32 PM

## 2019-10-24 NOTE — PROGRESS NOTES
Continued Stay Note  HETAL Domingo     Patient Name: Bruce Manzo  MRN: [id number]  Today's Date: 10/24/2019    Admit Date: 10/22/2019    Discharge Plan     Row Name 10/24/19 1317       Plan    Plan  [facility] consult per family's request     Patient/Family in Agreement with Plan  yes    Plan Comments  Inoperable brain tumor .        Discharge Codes    No documentation.             Kailee Sarah RN

## 2019-10-24 NOTE — NURSING NOTE
Patient transfer to unit from ICU , vitals stable is alert and oriented to name and date wife at bedside will conitnue to monitor

## 2019-10-24 NOTE — PROGRESS NOTES
Continued Stay Note  HETAL Domingo     Patient Name: Bruce Manzo  MRN: [id number]  Today's Date: 10/24/2019    Admit Date: 10/22/2019    Discharge Plan     Row Name 10/24/19 1620       Plan    Plan  Pt to D/C home with [facility] on 10/25.      Patient/Family in Agreement with Plan  yes     RUFINA JensenW, LSW    Phone:  [phone]

## 2019-10-24 NOTE — PLAN OF CARE
Problem: Fall Risk (Adult)  Goal: Absence of Fall  Outcome: Ongoing (interventions implemented as appropriate)   10/24/19 0432   Fall Risk (Adult)   Absence of Fall achieves outcome       Problem: Patient Care Overview  Goal: Plan of Care Review  Outcome: Ongoing (interventions implemented as appropriate)   10/24/19 0432   Coping/Psychosocial   Plan of Care Reviewed With patient;family   Plan of Care Review   Progress no change   OTHER   Outcome Summary Plan for CT today after contrast clarification. Otherwise stable and resting overnight. NIH remains a 4. Will continue to monitor.

## 2019-10-24 NOTE — PROGRESS NOTES
Pulmonary/ Critical Care progress Note        Patient Name:  Bruce Manzo    :  1935    Medical Record:  [id number]    Primary Care Physician     Rohit Goodman MD [facility]  Bruce Manzo is a 84 y.o. male who was presented to the ER for increasing confusion and ataxia.  This evening he went out into his car but just sat there.  In addition, the family states that he could not remember what he had for dinner.  Daughter reports some ataxia with a fall last week in the yard. Patient with a PMH sig for hypertension and previous CVA.   Patient's wife reports that he was just prescribed Plavix for what was felt to be a recent CVA but had not started taking it yet.  A code stroke was called in the ER.  Workup with CT scan showed a mass in the frontal lobes with a vasogenic edema and a left to right shift.   Patient was given Decadron in the ER and neurosurgery contacted with initiation of Keppra.      10/23/19:  Remains with dysarthria  10/24: no acute events overnight.  Tolerating room air.  No SOA or chest pain    Review of Systems    As above     Home medicationS  Prior to Admission medications    Medication Sig Start Date End Date Taking? Authorizing Provider   aspirin (ASPIR-LOW) 81 MG EC tablet Take 81 mg by mouth Daily. 12/10/18   Carlin Bermudez MD   atenolol (TENORMIN) 25 MG tablet Take 25 mg by mouth Daily. 19   Carlin Bermudez MD   pravastatin (PRAVACHOL) 20 MG tablet Take 20 mg by mouth Daily. 19   Carlin Bermudez MD   sertraline (ZOLOFT) 25 MG tablet Take 25 mg by mouth Daily. 19   Carlin Bermudez MD   terbinafine (lamiSIL) 250 MG tablet  5/22/19 10/22/19  Carlin Bermudez MD       Medical History    Past Medical History:   Diagnosis Date   • CVA (cerebral vascular accident) (CMS/HCC)    • Hypertension    • Prostate cancer (CMS/HCC)     Currently in radiation therapy         Surgical History    Past Surgical History:   Procedure Laterality Date   •  CARDIAC CATHETERIZATION  2015   • SKIN CANCER EXCISION      Neck         Family History    Family History   Problem Relation Age of Onset   • Colon cancer Mother    • Stroke Father    • Lung cancer Brother        Social History    Social History     Tobacco Use   • Smoking status: Never Smoker   Substance Use Topics   • Alcohol use: No     Frequency: Never        Allergies    No Known Allergies    Medications    Scheduled Meds:    dexamethasone 8 mg Intravenous Q12H   famotidine 20 mg Intravenous Q12H   insulin lispro 0-7 Units Subcutaneous Q6H   levETIRAcetam 500 mg Intravenous Q12H   sodium chloride 10 mL Intravenous Q12H     Continuous Infusions:   PRN Meds:.dextrose  •  dextrose  •  glucagon (human recombinant)  •  influenza vaccine  •  sodium chloride  •  sodium chloride      Physical Exam    tMax 24 hrs:  Temp (24hrs), Av.8 °F (36.6 °C), Min:97.4 °F (36.3 °C), Max:98.1 °F (36.7 °C)    Vitals Ranges:  Temp:  [97.4 °F (36.3 °C)-98.1 °F (36.7 °C)] 98.1 °F (36.7 °C)  Heart Rate:  [62-92] 63  Resp:  [15-18] 15  BP: (111-146)/(40-83) 117/57  Intake and Output Last 3 Shifts:  I/O last 3 completed shifts:  In: 1079 [P.O.:840; I.V.:189; IV Piggyback:50]  Out: -     Constitutional:  Alert, no acute respiratory distress   HEENT:  Atraumatic, PERRL, conjunctiva normal, no nasal discharge.  Trachea is midline.  Respiratory:  No respiratory distress, normal breath sounds, no rales, no wheezing   Cardiovascular:  Normal rate, normal rhythm and no murmurs.   GI:  Soft, nondistended, positive bowel sounds.  : Deferred  Extremities: No edema, cyanosis or tenderness.  Integument:  No rashes.   Neurologic:  Alert & oriented to self and place only.   CN 2-12 normal, normal motor function, no lateralizing deficits  Psychiatric:  Speech and behavior appropriate     labs    Lab Results (last 24 hours)     Procedure Component Value Units Date/Time    Basic Metabolic Panel [[id number]]  (Abnormal) Collected:  10/24/19 0221     Specimen:  Blood Updated:  10/24/19 0730     Glucose 159 mg/dL      BUN 19 mg/dL      Creatinine 0.72 mg/dL      Sodium 141 mmol/L      Potassium 3.8 mmol/L      Chloride 104 mmol/L      CO2 24.0 mmol/L      Calcium 9.4 mg/dL      eGFR Non African Amer 104 mL/min/1.73      BUN/Creatinine Ratio 26.4     Anion Gap 13.0 mmol/L     Narrative:       GFR Normal >60  Chronic Kidney Disease <60  Kidney Failure <15    Magnesium [[id number]]  (Normal) Collected:  10/24/19 0404    Specimen:  Blood Updated:  10/24/19 0721     Magnesium 1.9 mg/dL     CBC & Differential [[id number]] Collected:  10/24/19 0404    Specimen:  Blood Updated:  10/24/19 0547    Narrative:       The following orders were created for panel order CBC & Differential.  Procedure                               Abnormality         Status                     ---------                               -----------         ------                     CBC Auto Differential[[id number]]        Abnormal            Final result                 Please view results for these tests on the individual orders.    CBC Auto Differential [[id number]]  (Abnormal) Collected:  10/24/19 0404    Specimen:  Blood Updated:  10/24/19 0547     WBC 14.20 10*3/mm3      Comment: Result checked         RBC 4.08 10*6/mm3      Hemoglobin 13.3 g/dL      Hematocrit 39.5 %      MCV 96.9 fL      MCH 32.7 pg      MCHC 33.8 g/dL      RDW 13.7 %      RDW-SD 46.4 fl      MPV 9.5 fL      Platelets 210 10*3/mm3      Neutrophil % 90.1 %      Lymphocyte % 7.3 %      Monocyte % 2.6 %      Eosinophil % 0.0 %      Basophil % 0.0 %      Neutrophils, Absolute 12.80 10*3/mm3      Lymphocytes, Absolute 1.00 10*3/mm3      Monocytes, Absolute 0.40 10*3/mm3      Eosinophils, Absolute 0.00 10*3/mm3      Basophils, Absolute 0.00 10*3/mm3      nRBC 0.0 /100 WBC     POC Glucose Once [[id number]]  (Abnormal) Collected:  10/24/19 0532    Specimen:  Blood Updated:  10/24/19 0534     Glucose 135 mg/dL      Comment: Serial  Number: [id number]:  [id number]       POC Glucose Once [[id number]]  (Abnormal) Collected:  10/23/19 2336    Specimen:  Blood Updated:  10/23/19 2349     Glucose 153 mg/dL      Comment: Serial Number: [id number]:  [id number]       POC Glucose Once [[id number]]  (Abnormal) Collected:  10/23/19 2021    Specimen:  Blood Updated:  10/23/19 2022     Glucose 191 mg/dL      Comment: Serial Number: [id number]:  [id number]       POC Glucose Once [[id number]]  (Abnormal) Collected:  10/23/19 1850    Specimen:  Blood Updated:  10/23/19 1852     Glucose 188 mg/dL      Comment: Serial Number: [id number]:  [id number]       Urinalysis With Culture If Indicated - Urine, Random Void [[id number]]  (Abnormal) Collected:  10/23/19 1812    Specimen:  Urine, Random Void Updated:  10/23/19 1829     Color, UA Yellow     Appearance, UA Clear     pH, UA 6.0     Specific Gravity, UA 1.029     Glucose, UA Negative     Ketones, UA Trace     Bilirubin, UA Negative     Blood, UA Negative     Protein, UA Negative     Leuk Esterase, UA Negative     Nitrite, UA Negative     Urobilinogen, UA 0.2 E.U./dL    Narrative:       Urine microscopic not indicated.    Vitamin B12 [[id number]]  (Abnormal) Collected:  10/23/19 1127    Specimen:  Blood Updated:  10/23/19 1633     Vitamin B-12 180 pg/mL     POC Glucose Once [[id number]]  (Abnormal) Collected:  10/23/19 1402    Specimen:  Blood Updated:  10/23/19 1403     Glucose 150 mg/dL      Comment: Serial Number: [id number]:  [id number]       POC Glucose Once [[id number]]  (Abnormal) Collected:  10/23/19 1246    Specimen:  Blood Updated:  10/23/19 1247     Glucose 154 mg/dL      Comment: Serial Number: [id number]:  [id number]       Hemoglobin A1c [[id number]]  (Normal) Collected:  10/23/19 0403    Specimen:  Blood Updated:  10/23/19 1059     Hemoglobin A1C 5.3 %     Narrative:       Hemoglobin A1C Reference Range:    <5.7 %        Normal  5.7-6.4 %     Increased risk for diabetes  >  6.4 %        Diabetes       These guidelines have been recommended by the American Diabetic Association for Hgb A1c.      The following 2010 guidelines have been recommended by the American Diabetes Association for Hemoglobin A1c.    HBA1c 5.7-6.4% Increased risk for future diabetes (pre-diabetes)  HBA1c     >6.4% Diabetes    TSH [[id number]]  (Normal) Collected:  10/22/19 2055    Specimen:  Blood from Arm, Right Updated:  10/23/19 1037     TSH 3.120 uIU/mL     Lipid Panel [[id number]]  (Abnormal) Collected:  10/22/19 2055    Specimen:  Blood from Arm, Right Updated:  10/23/19 1030     Total Cholesterol 81 mg/dL      Triglycerides 158 mg/dL      HDL Cholesterol 24 mg/dL      LDL Cholesterol  25 mg/dL      VLDL Cholesterol 31.6 mg/dL      LDL/HDL Ratio 1.06    Narrative:       Cholesterol Reference Ranges  (U.S. Department of Health and Human Services ATP III Classifications)    Desirable          <200 mg/dL  Borderline High    200-239 mg/dL  High Risk          >240 mg/dL      Triglyceride Reference Ranges  (U.S. Department of Health and Human Services ATP III Classifications)    Normal           <150 mg/dL  Borderline High  150-199 mg/dL  High             200-499 mg/dL  Very High        >500 mg/dL    HDL Reference Ranges  (U.S. Department of Health and Human Services ATP III Classifcations)    Low     <40 mg/dl (major risk factor for CHD)  High    >60 mg/dl ('negative' risk factor for CHD)        LDL Reference Ranges  (U.S. Department of Health and Human Services ATP III Classifcations)    Optimal          <100 mg/dL  Near Optimal     100-129 mg/dL  Borderline High  130-159 mg/dL  High             160-189 mg/dL  Very High        >189 mg/dL          Imaging & Other Studies    Imaging Results (last 72 hours)     Procedure Component Value Units Date/Time    XR Chest 1 View [[id number]] Collected:  10/22/19 2101     Updated:  10/22/19 2105    Narrative:       DATE OF EXAM:  10/22/2019 8:54 PM     PROCEDURE:  XR CHEST 1  VW-     INDICATIONS:  Altered mental status, hypertension.      COMPARISON:  10/24/2018.     TECHNIQUE:   Single radiographic view of the chest was obtained.     FINDINGS:  The heart size is normal. The pulmonary vascular markings are normal.  There is stable minor scarring in the lung bases. The lungs and pleural  spaces are clear of active disease.  There are mild chronic age-related  changes involving the bony thorax and thoracic aorta.       Impression:       No active disease.     Electronically Signed By-Rohit Villa On:10/22/2019 9:03 PM  This report was finalized on [id number] by  Rohit Villa, .    CT Head Without Contrast Stroke Protocol [[id number]] Collected:  10/22/19 2026     Updated:  10/22/19 2033    Narrative:          DATE OF EXAM:  10/22/2019 8:15 PM     PROCEDURE:   CT HEAD WO CONTRAST STROKE PROTOCOL-     INDICATIONS:  Cerebral vascular accident protocol, confusion, right-sided weakness,  abnormal gait.     COMPARISON:   MRI of the brain performed on 10/24/2018.     TECHNIQUE:  Routine transaxial cuts were obtained through the head without the  administration of contrast. Automated exposure control and iterative  reconstruction methods were used.     FINDINGS:  Study is markedly abnormal. There is a hyperdense mass located within  the frontal lobes which crosses midline through the corpus callosum.  There appear to be several additional hyperdense masses in the left  corona radiata. This is concerning for either a GBM versus CNS lymphoma.  There appears to be a large area of vasogenic edema in the left cerebral  hemisphere mainly involving the frontal lobe. There also may be volume  loss due to encephalomalacia. Mild vasogenic edema is seen in the right  frontal lobe. There is effacement of the left lateral ventricle. There  is approximately 7 mm of left-to-right subfalcine shift. The basal  cisterns and fourth ventricle are well-maintained and normal. There may  be an old lacunar infarct within  the left aspect of the virgilio. The  orbital contents are normal. The paranasal and mastoid sinuses are  clear. The calvarium is normal.        Impression:       IMPRESSION :     1. There is a hyperdense mass located within the frontal lobes which  crosses midline through the corpus callosum. There appear to be  additional hyperdense masses in the left corona radiata. This is  concerning for a GBM versus CNS lymphoma. I would recommend an MRI of  the brain with and without contrast for follow-up. The findings were  discussed with Dr. Yoseph Vincent M.D. by telephone.  2. There is vasogenic edema which is most marked in the left frontal  lobe. I cannot completely exclude encephalomalacia in the left frontal  lobe.  3. There is 7 mm a left to right subfalcine shift. There is no  transtentorial herniation.  4. Questionable old lacunar infarct in the left aspect of the virgilio.     Electronically Signed By-Rohit Villa On:10/22/2019 8:31 PM  This report was finalized on [id number] by  Rohit Villa, .          Assessment/plan  Brain mass  Ataxia  Acute encephalopathy sec to brain mass   Hypertension  H/o prostate cancer  H/o CVA    Plan:    Decadron loading dose given by ER, will continue 8 mg BID  Neurosurgery consult - no intervention.  Non-surgical  Neurology evaluated  Keppra    MRI w/wo with stereotatic guidance protocol ordered   Diet ordered   Blood pressure control with IV agents PRN  Resume home medication as appropriate     PUD:   Insulin:  Sliding scale  VTE:  SCDs  Nutrition: Diet ordered    Attending physician statement:  High risk   Above note scribed by nurse practitioner for me and later reviewed for accuracy. I've examined the patient and reviewed all labs and images.   I have directly participated in the evaluation and management of this patient.  Zeus Prajapati MD  [facility]  [facility]

## 2019-10-24 NOTE — PROGRESS NOTES
Hematology/Oncology Inpatient Progress Note    PATIENT NAME: Bruce Manzo  : 1935  MRN: [id number]    CHIEF COMPLAINT: Brain Mass    HISTORY OF PRESENT ILLNESS:  84 y.o. male who was admitted thru the ER on 10/22/19 reporting an acute onset of short term memory loss.  He had gotten into his car and then sat there and did not know why he was there.  His family had noticed some unusual behavior over the past week-shaving over part of his face at a time.  CT head was abnormal and a brain MRI confirmed a 7.8 cm mass with extensive edema in the frontal lobes crossing the corpus callosum with a a left to right shift.  He was started on dexamethasone and neurosurgery was consulted.  He had a h/o CVA and neurology was consulted.  CXR was neg. And laboratory evaluation was unremarkable.   His wife reports h/o a skin cancer excision from his left neck in May 2019.  He has a h/o prostate cancer more than 5 years ago, treated with XRT and radiation beads.  He is seen annually by urology and has never recurred.       10/23/19  Hematology/Oncology was consulted for his brain mass.       PCP: Rohit Goodman MD    Subjective  He is doing ok, with no c/o.      ROS:  Review of Systems   Constitutional: Negative for diaphoresis, fatigue, fever and unexpected weight change.   HENT: Negative for congestion and nosebleeds.    Eyes: Negative.    Respiratory: Negative for cough and shortness of breath.    Cardiovascular: Negative for chest pain and leg swelling.   Gastrointestinal: Negative for abdominal pain, blood in stool, constipation, diarrhea, nausea and vomiting.   Endocrine: Negative for cold intolerance and heat intolerance.   Genitourinary: Negative for dysuria and hematuria.   Musculoskeletal: Negative for arthralgias and joint swelling.   Skin: Negative for rash and wound.   Neurological: Negative for numbness and headaches.   Hematological: Does not bruise/bleed easily.   Psychiatric/Behavioral: Negative for  "confusion. The patient is not nervous/anxious.    All other systems reviewed and are negative.       MEDICATIONS:    Scheduled Meds:    dexamethasone 8 mg Intravenous Q12H   famotidine 20 mg Intravenous Q12H   insulin lispro 0-7 Units Subcutaneous Q6H   levETIRAcetam 500 mg Intravenous Q12H   sodium chloride 10 mL Intravenous Q12H      Continuous Infusions:      PRN Meds:  dextrose  •  dextrose  •  glucagon (human recombinant)  •  influenza vaccine  •  sodium chloride  •  sodium chloride     ALLERGIES:  No Known Allergies    Objective    VITALS:   /57   Pulse 63   Temp 98.3 °F (36.8 °C)   Resp 15   Ht 190.5 cm (75\")   Wt 125 kg (275 lb 5.7 oz)   SpO2 90%   BMI 34.42 kg/m²     PHYSICAL EXAM:  Physical Exam   Constitutional: He is oriented to person, place, and time. He appears well-developed and well-nourished.   Obese.     HENT:   Head: Normocephalic and atraumatic.   Mouth/Throat: Oropharynx is clear and moist.   Dental fillings.   Eyes: Conjunctivae, EOM and lids are normal. Pupils are equal, round, and reactive to light.   Neck: Normal range of motion. Neck supple. No thyromegaly present.   Cardiovascular: Normal rate, regular rhythm and normal heart sounds.   No murmur heard.  Pulmonary/Chest: Effort normal and breath sounds normal. No respiratory distress.   Abdominal: Soft. Normal appearance and bowel sounds are normal. He exhibits no distension.   Genitourinary:   Genitourinary Comments: Deferred.   Musculoskeletal: Normal range of motion. He exhibits no edema.   Lymphadenopathy:     He has no cervical adenopathy.     He has no axillary adenopathy.        Right: No supraclavicular adenopathy present.        Left: No supraclavicular adenopathy present.   Neurological: He is alert and oriented to person, place, and time.   Skin: Skin is warm and dry. Capillary refill takes less than 2 seconds. No bruising, no petechiae and no rash noted.   Psychiatric: He has a normal mood and affect. His speech is " normal and behavior is normal. Judgment and thought content normal. Cognition and memory are normal.   Nursing note and vitals reviewed.        RECENT LABS:  Lab Results (last 24 hours)     Procedure Component Value Units Date/Time    Basic Metabolic Panel [[id number]]  (Abnormal) Collected:  10/24/19 0404    Specimen:  Blood Updated:  10/24/19 0730     Glucose 159 mg/dL      BUN 19 mg/dL      Creatinine 0.72 mg/dL      Sodium 141 mmol/L      Potassium 3.8 mmol/L      Chloride 104 mmol/L      CO2 24.0 mmol/L      Calcium 9.4 mg/dL      eGFR Non African Amer 104 mL/min/1.73      BUN/Creatinine Ratio 26.4     Anion Gap 13.0 mmol/L     Narrative:       GFR Normal >60  Chronic Kidney Disease <60  Kidney Failure <15    Magnesium [[id number]]  (Normal) Collected:  10/24/19 0404    Specimen:  Blood Updated:  10/24/19 0721     Magnesium 1.9 mg/dL     CBC & Differential [[id number]] Collected:  10/24/19 0404    Specimen:  Blood Updated:  10/24/19 0547    Narrative:       The following orders were created for panel order CBC & Differential.  Procedure                               Abnormality         Status                     ---------                               -----------         ------                     CBC Auto Differential[[id number]]        Abnormal            Final result                 Please view results for these tests on the individual orders.    CBC Auto Differential [[id number]]  (Abnormal) Collected:  10/24/19 0404    Specimen:  Blood Updated:  10/24/19 0547     WBC 14.20 10*3/mm3      Comment: Result checked         RBC 4.08 10*6/mm3      Hemoglobin 13.3 g/dL      Hematocrit 39.5 %      MCV 96.9 fL      MCH 32.7 pg      MCHC 33.8 g/dL      RDW 13.7 %      RDW-SD 46.4 fl      MPV 9.5 fL      Platelets 210 10*3/mm3      Neutrophil % 90.1 %      Lymphocyte % 7.3 %      Monocyte % 2.6 %      Eosinophil % 0.0 %      Basophil % 0.0 %      Neutrophils, Absolute 12.80 10*3/mm3      Lymphocytes, Absolute 1.00 10*3/mm3       Monocytes, Absolute 0.40 10*3/mm3      Eosinophils, Absolute 0.00 10*3/mm3      Basophils, Absolute 0.00 10*3/mm3      nRBC 0.0 /100 WBC     POC Glucose Once [[id number]]  (Abnormal) Collected:  10/24/19 0532    Specimen:  Blood Updated:  10/24/19 0534     Glucose 135 mg/dL      Comment: Serial Number: [id number]:  [id number]       POC Glucose Once [[id number]]  (Abnormal) Collected:  10/23/19 2336    Specimen:  Blood Updated:  10/23/19 2349     Glucose 153 mg/dL      Comment: Serial Number: [id number]:  [id number]       POC Glucose Once [[id number]]  (Abnormal) Collected:  10/23/19 2021    Specimen:  Blood Updated:  10/23/19 2022     Glucose 191 mg/dL      Comment: Serial Number: [id number]:  [id number]       POC Glucose Once [[id number]]  (Abnormal) Collected:  10/23/19 1850    Specimen:  Blood Updated:  10/23/19 1852     Glucose 188 mg/dL      Comment: Serial Number: [id number]:  [id number]       Urinalysis With Culture If Indicated - Urine, Random Void [[id number]]  (Abnormal) Collected:  10/23/19 1812    Specimen:  Urine, Random Void Updated:  10/23/19 1829     Color, UA Yellow     Appearance, UA Clear     pH, UA 6.0     Specific Gravity, UA 1.029     Glucose, UA Negative     Ketones, UA Trace     Bilirubin, UA Negative     Blood, UA Negative     Protein, UA Negative     Leuk Esterase, UA Negative     Nitrite, UA Negative     Urobilinogen, UA 0.2 E.U./dL    Narrative:       Urine microscopic not indicated.    Vitamin B12 [[id number]]  (Abnormal) Collected:  10/23/19 1127    Specimen:  Blood Updated:  10/23/19 1633     Vitamin B-12 180 pg/mL     POC Glucose Once [[id number]]  (Abnormal) Collected:  10/23/19 1402    Specimen:  Blood Updated:  10/23/19 1403     Glucose 150 mg/dL      Comment: Serial Number: [id number]:  [id number]       POC Glucose Once [[id number]]  (Abnormal) Collected:  10/23/19 1246    Specimen:  Blood Updated:  10/23/19 1247     Glucose 154 mg/dL      Comment: Serial  Number: [id number]:  [id number]       Hemoglobin A1c [[id number]]  (Normal) Collected:  10/23/19 0403    Specimen:  Blood Updated:  10/23/19 1059     Hemoglobin A1C 5.3 %     Narrative:       Hemoglobin A1C Reference Range:    <5.7 %        Normal  5.7-6.4 %     Increased risk for diabetes  > 6.4 %        Diabetes       These guidelines have been recommended by the American Diabetic Association for Hgb A1c.      The following 2010 guidelines have been recommended by the American Diabetes Association for Hemoglobin A1c.    HBA1c 5.7-6.4% Increased risk for future diabetes (pre-diabetes)  HBA1c     >6.4% Diabetes    TSH [[id number]]  (Normal) Collected:  10/22/19 2055    Specimen:  Blood from Arm, Right Updated:  10/23/19 1037     TSH 3.120 uIU/mL     Lipid Panel [[id number]]  (Abnormal) Collected:  10/22/19 2055    Specimen:  Blood from Arm, Right Updated:  10/23/19 1030     Total Cholesterol 81 mg/dL      Triglycerides 158 mg/dL      HDL Cholesterol 24 mg/dL      LDL Cholesterol  25 mg/dL      VLDL Cholesterol 31.6 mg/dL      LDL/HDL Ratio 1.06    Narrative:       Cholesterol Reference Ranges  (U.S. Department of Health and Human Services ATP III Classifications)    Desirable          <200 mg/dL  Borderline High    200-239 mg/dL  High Risk          >240 mg/dL      Triglyceride Reference Ranges  (U.S. Department of Health and Human Services ATP III Classifications)    Normal           <150 mg/dL  Borderline High  150-199 mg/dL  High             200-499 mg/dL  Very High        >500 mg/dL    HDL Reference Ranges  (U.S. Department of Health and Human Services ATP III Classifcations)    Low     <40 mg/dl (major risk factor for CHD)  High    >60 mg/dl ('negative' risk factor for CHD)        LDL Reference Ranges  (U.S. Department of Health and Human Services ATP III Classifcations)    Optimal          <100 mg/dL  Near Optimal     100-129 mg/dL  Borderline High  130-159 mg/dL  High             160-189 mg/dL  Very High         >189 mg/dL          PENDING RESULTS: path from Dr. Fair    IMAGING REVIEWED:  Mri Brain With & Without Contrast    Result Date: 10/23/2019  This study is abnormal. There are enhancing abnormalities in the bilateral frontal lobes, which extend across the genu of the corpus callosum and expand the corpus callosum anteriorly. 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 Lymphoma cannot be excluded but is thought to be less likely. There is extensive surrounding vasogenic or tumor edema with associated mass effect. There is approximately 8 mm of left-to-right subfalcine herniation associated with the findings. No definite acute intracranial hemorrhage is seen. No definite acute infarct is identified. There is associated deformity of the lateral ventricles, greater on the left, especially involving the anterior horns of the lateral ventricles.  Electronically Signed By-Dr. Derick Kiser MD On:10/23/2019 2:05 PM This report was finalized on [id number] by Dr. Derick Kiser MD.    Xr Chest 1 View    Result Date: 10/22/2019  No active disease.  Electronically Signed By-Rohit Villa On:10/22/2019 9:03 PM This report was finalized on [id number] by  Rohit Villa, .    Ct Head Without Contrast Stroke Protocol    Result Date: 10/22/2019  IMPRESSION :  1. There is a hyperdense mass located within the frontal lobes which crosses midline through the corpus callosum. There appear to be additional hyperdense masses in the left corona radiata. This is concerning for a GBM versus CNS lymphoma. I would recommend an MRI of the brain with and without contrast for follow-up. The findings were discussed with Dr. Yoseph Vincent M.D. by telephone. 2. There is vasogenic edema which is most marked in the left frontal lobe. I cannot completely exclude encephalomalacia in the left frontal lobe. 3. There is 7 mm a left to right subfalcine shift. There is no transtentorial herniation. 4. Questionable old lacunar  infarct in the left aspect of the virgilio.  Electronically Signed By-Rohit Villa On:10/22/2019 8:31 PM This report was finalized on [id number] by  Rohit Villa, .      I have reviewed the patient's labs, imaging, reports, and other clinician documentation.    Assessment/Plan   ASSESSMENT:  1. Frontal brain mass-Neurosurgery consulted. On dexamethasone, pepcid and keppra.  Body CT's pending.  H/O skin cancer removed in 5/2019-will obtain path.  Concern for either a GBM or CNS lymphoma. Wife is planning to confirm with her son (POA) that they want no treatment for his brain mass.     2. Leukocytosis-due to steroids.   3. H/O prostate cancer-diagnosed > 5 yr ago and has never recurred.  UTD on f/u.    4. H/O left neck skin cancer-will obtain path as melanoma skin cancers can frequently metastasize to the brain.  5. HTN/H/O CVA- ASA on hold for possible brain biopsy.      PLAN  1. Await CT's  2. Obtain path from Dr. Fair's office-our office will obtain.  3. Family interested in comfort measures only.      Electronically signed by KARRI Khan, 10/24/19, 9:19 AM.          I have personally performed a face-to-face diagnostic evaluation on this patient.  I have discussed the case   with Cordelia Valencia NP, have edited/reviewed the note,  and agree with the care plan.  The patient claims to be feeling well moving extremities equally but on examination does have memory loss.  His scans revealing an unresectable brain tumor.  Decisions regarding biopsy versus palliative care are being made.  Case discussed with Dr. Mesa.          I discussed the patients findings and my recommendations with patient and family    SKYLAR Rojo MD  10/24/19  9:11 AM

## 2019-10-24 NOTE — PROGRESS NOTES
Palliative Care Follow Up Note    Patient Code Status    Code Status and Medical Interventions:   Ordered at: 10/24/19 1324     Level Of Support Discussed With:    Health Care Surrogate     Code Status:    No CPR     Medical Interventions (Level of Support Prior to Arrest):    Full          LOS: 2 days   Patient Care Team:  Rohit Goodman MD as PCP - General  Rohit Goodman MD as PCP - Claims Attributed    Chief Complaint:  denies    Interval History:     Patient Complaints:   Patient Denies:    History taken from:  Patient sitting up In the bed, denies discomfort. Awake and alert but remains confused. Wife at bedside, awaiting further information from MD, she is very hard of hearing. Waiting on her daughter to visit, states that she understands what is going on.   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      Review of Systems   Unable to perform ROS: Acuity of condition       Physical Exam   Constitutional: He appears well-developed.   HENT:   Head: Normocephalic.   Eyes: Pupils are equal, round, and reactive to light.   Neck: Normal range of motion.   Cardiovascular: Normal rate.   Pulmonary/Chest: Effort normal and breath sounds normal.   Abdominal: Soft. Bowel sounds are normal.   Neurological: He is alert.   Skin: Skin is warm and dry.   Vitals reviewed.      Vital Signs (last 24 hours)       10/23 0700  -  10/24 0659 10/24 0700  -  10/24 1407   Most Recent    Temp (°F) 97.4 -  98.1    97.5 -  98.3     97.5 (36.4)    Heart Rate 62 -  92    64 -  84     64    Resp 15 -  18       15    /50 -  146/77    116/50 -  154/76     116/50    SpO2 (%) (!)89 -  100    94 -  96     94          Lab Results (last 24 hours)     Procedure Component Value Units Date/Time    Blood Culture - Blood, Hand, Left [[id number]] Collected:  10/24/19 135    Specimen:  Blood from Hand, Left  Updated:  10/24/19 1401    Blood Culture - Blood, Hand, Right [[id number]] Collected:  10/24/19 1347    Specimen:  Blood from Hand, Right Updated:  10/24/19 1401    Lactic Acid, Plasma [[id number]] Collected:  10/24/19 1347    Specimen:  Blood Updated:  10/24/19 1350    MRSA Screen Culture - Swab, Nares [[id number]]  (Normal) Collected:  10/23/19 0111    Specimen:  Swab from Nares Updated:  10/24/19 1249     MRSA SCREEN CX No Methicillin Resistant Staphylococcus aureus isolated    POC Glucose Once [[id number]]  (Abnormal) Collected:  10/24/19 1207    Specimen:  Blood Updated:  10/24/19 1218     Glucose 137 mg/dL      Comment: Serial Number: [id number]:  [id number]       Basic Metabolic Panel [[id number]]  (Abnormal) Collected:  10/24/19 0404    Specimen:  Blood Updated:  10/24/19 0730     Glucose 159 mg/dL      BUN 19 mg/dL      Creatinine 0.72 mg/dL      Sodium 141 mmol/L      Potassium 3.8 mmol/L      Chloride 104 mmol/L      CO2 24.0 mmol/L      Calcium 9.4 mg/dL      eGFR Non African Amer 104 mL/min/1.73      BUN/Creatinine Ratio 26.4     Anion Gap 13.0 mmol/L     Narrative:       GFR Normal >60  Chronic Kidney Disease <60  Kidney Failure <15    Magnesium [[id number]]  (Normal) Collected:  10/24/19 0404    Specimen:  Blood Updated:  10/24/19 0721     Magnesium 1.9 mg/dL     CBC & Differential [[id number]] Collected:  10/24/19 0404    Specimen:  Blood Updated:  10/24/19 0547    Narrative:       The following orders were created for panel order CBC & Differential.  Procedure                               Abnormality         Status                     ---------                               -----------         ------                     CBC Auto Differential[[id number]]        Abnormal            Final result                 Please view results for these tests on the individual orders.    CBC Auto Differential [[id number]]  (Abnormal) Collected:  10/24/19 0404    Specimen:  Blood Updated:  10/24/19 0547     WBC 14.20  10*3/mm3      Comment: Result checked         RBC 4.08 10*6/mm3      Hemoglobin 13.3 g/dL      Hematocrit 39.5 %      MCV 96.9 fL      MCH 32.7 pg      MCHC 33.8 g/dL      RDW 13.7 %      RDW-SD 46.4 fl      MPV 9.5 fL      Platelets 210 10*3/mm3      Neutrophil % 90.1 %      Lymphocyte % 7.3 %      Monocyte % 2.6 %      Eosinophil % 0.0 %      Basophil % 0.0 %      Neutrophils, Absolute 12.80 10*3/mm3      Lymphocytes, Absolute 1.00 10*3/mm3      Monocytes, Absolute 0.40 10*3/mm3      Eosinophils, Absolute 0.00 10*3/mm3      Basophils, Absolute 0.00 10*3/mm3      nRBC 0.0 /100 WBC     POC Glucose Once [[id number]]  (Abnormal) Collected:  10/24/19 0532    Specimen:  Blood Updated:  10/24/19 0534     Glucose 135 mg/dL      Comment: Serial Number: [id number]:  [id number]       POC Glucose Once [[id number]]  (Abnormal) Collected:  10/23/19 2336    Specimen:  Blood Updated:  10/23/19 2349     Glucose 153 mg/dL      Comment: Serial Number: [id number]:  [id number]       POC Glucose Once [[id number]]  (Abnormal) Collected:  10/23/19 2021    Specimen:  Blood Updated:  10/23/19 2022     Glucose 191 mg/dL      Comment: Serial Number: [id number]:  [id number]       POC Glucose Once [[id number]]  (Abnormal) Collected:  10/23/19 1850    Specimen:  Blood Updated:  10/23/19 1852     Glucose 188 mg/dL      Comment: Serial Number: [id number]:  [id number]       Urinalysis With Culture If Indicated - Urine, Random Void [[id number]]  (Abnormal) Collected:  10/23/19 1812    Specimen:  Urine, Random Void Updated:  10/23/19 1829     Color, UA Yellow     Appearance, UA Clear     pH, UA 6.0     Specific Gravity, UA 1.029     Glucose, UA Negative     Ketones, UA Trace     Bilirubin, UA Negative     Blood, UA Negative     Protein, UA Negative     Leuk Esterase, UA Negative     Nitrite, UA Negative     Urobilinogen, UA 0.2 E.U./dL    Narrative:       Urine microscopic not indicated.    Vitamin B12 [[id number]]  (Abnormal)  Collected:  10/23/19 1127    Specimen:  Blood Updated:  10/23/19 1633     Vitamin B-12 180 pg/mL            Results Review:     I reviewed the patient's new clinical results.      Assessment/Plan     Active Problems:    Memory loss due to medical condition    Brain mass    Altered mental state      Plan    Downgrade room, outpt follow up with oncology    KARRI Murphy  Palliative Medicine

## 2019-10-25 PROBLEM — D49.6 NEOPLASM OF BRAIN CAUSING MASS EFFECT ON ADJACENT STRUCTURES (HCC): Status: ACTIVE | Noted: 2019-01-01

## 2019-10-25 PROBLEM — G93.5 NEOPLASM OF BRAIN CAUSING MASS EFFECT ON ADJACENT STRUCTURES (HCC): Status: ACTIVE | Noted: 2019-01-01

## 2019-10-25 PROBLEM — R42 DIZZINESS: Status: RESOLVED | Noted: 2018-11-15 | Resolved: 2019-01-01

## 2019-10-25 PROBLEM — R55 NEAR SYNCOPE: Status: RESOLVED | Noted: 2018-11-15 | Resolved: 2019-01-01

## 2019-10-25 NOTE — PROGRESS NOTES
Hematology/Oncology Inpatient Progress Note    PATIENT NAME: Bruce Manzo  : 1935  MRN: [id number]    CHIEF COMPLAINT: Brain Mass    HISTORY OF PRESENT ILLNESS:  84 y.o. male who was admitted thru the ER on 10/22/19 reporting an acute onset of short term memory loss.  He had gotten into his car and then sat there and did not know why he was there.  His family had noticed some unusual behavior over the past week-shaving over part of his face at a time.  CT head was abnormal and a brain MRI confirmed a 7.8 cm mass with extensive edema in the frontal lobes crossing the corpus callosum with a a left to right shift.  He was started on dexamethasone and neurosurgery was consulted.  He had a h/o CVA and neurology was consulted.  CXR was neg. And laboratory evaluation was unremarkable.   His wife reports h/o a skin cancer excision from his left neck in May 2019.  He has a h/o prostate cancer more than 5 years ago, treated with XRT and radiation beads.  He is seen annually by urology and has never recurred.       10/23/19  Hematology/Oncology was consulted for his brain mass.       PCP: Rohit Goodman MD    Subjective  Choking some with eating.     ROS:  Review of Systems   Constitutional: Negative for diaphoresis, fatigue, fever and unexpected weight change.        Eating well   HENT: Negative for congestion and nosebleeds.    Eyes: Negative.  Negative for visual disturbance.   Respiratory: Positive for choking. Negative for cough, chest tightness and shortness of breath.    Cardiovascular: Negative for chest pain and leg swelling.   Gastrointestinal: Negative for abdominal pain, blood in stool, constipation, diarrhea, nausea and vomiting.   Endocrine: Negative for cold intolerance and heat intolerance.   Genitourinary: Negative for dysuria and hematuria.        Incontinence present   Musculoskeletal: Negative for arthralgias and joint swelling.   Skin: Negative for rash and wound.   Neurological: Negative for  "dizziness, speech difficulty, numbness and headaches.   Hematological: Does not bruise/bleed easily.   Psychiatric/Behavioral: Negative for confusion. The patient is not nervous/anxious.    All other systems reviewed and are negative.       MEDICATIONS:    Scheduled Meds:      dexamethasone 8 mg Intravenous Q12H   insulin lispro 0-7 Units Subcutaneous Q6H   levETIRAcetam 500 mg Intravenous Q12H   sodium chloride 10 mL Intravenous Q12H      Continuous Infusions:      PRN Meds:  dextrose  •  dextrose  •  glucagon (human recombinant)  •  influenza vaccine  •  sodium chloride  •  sodium chloride     ALLERGIES:  No Known Allergies    Objective    VITALS:   /72 (BP Location: Left arm, Patient Position: Lying)   Pulse 63   Temp 97.6 °F (36.4 °C) (Oral)   Resp 20   Ht 190.5 cm (75\")   Wt 125 kg (275 lb 5.7 oz)   SpO2 95%   BMI 34.42 kg/m²     PHYSICAL EXAM:  Physical Exam   Constitutional: He appears well-developed and well-nourished. No distress.   Obese.     HENT:   Head: Normocephalic and atraumatic.   Nose: Nose normal.   Mouth/Throat: Oropharynx is clear and moist. No oropharyngeal exudate.   Dentures   Eyes: Conjunctivae, EOM and lids are normal. Pupils are equal, round, and reactive to light. Right eye exhibits no discharge. Left eye exhibits no discharge. No scleral icterus.   Neck: Normal range of motion. Neck supple. No thyromegaly present.   Cardiovascular: Normal rate, regular rhythm, normal heart sounds and intact distal pulses.   No murmur heard.  Pulmonary/Chest: Effort normal and breath sounds normal. No respiratory distress. He has no wheezes.   Abdominal: Soft. Normal appearance and bowel sounds are normal. He exhibits no distension. There is no tenderness.   Genitourinary:   Genitourinary Comments: Deferred.   Musculoskeletal: Normal range of motion. He exhibits no edema, tenderness or deformity.   Lymphadenopathy:     He has no cervical adenopathy.     He has no axillary adenopathy.        " Right: No supraclavicular adenopathy present.        Left: No supraclavicular adenopathy present.   Neurological: He is alert. Coordination normal.   Oriented x1   Skin: Skin is warm and dry. Capillary refill takes less than 2 seconds. No bruising, no petechiae and no rash noted. He is not diaphoretic. No erythema. No pallor.   LUE IV   Psychiatric: He has a normal mood and affect. His speech is normal and behavior is normal. Cognition and memory are normal.   Nursing note and vitals reviewed.        RECENT LABS:  Lab Results (last 24 hours)     Procedure Component Value Units Date/Time    Basic Metabolic Panel [[id number]]  (Abnormal) Collected:  10/25/19 0619    Specimen:  Blood Updated:  10/25/19 0836     Glucose 136 mg/dL      BUN 21 mg/dL      Creatinine 0.74 mg/dL      Sodium 143 mmol/L      Potassium 4.1 mmol/L      Chloride 105 mmol/L      CO2 25.0 mmol/L      Calcium 9.6 mg/dL      eGFR Non African Amer 101 mL/min/1.73      BUN/Creatinine Ratio 28.4     Anion Gap 13.0 mmol/L     Narrative:       GFR Normal >60  Chronic Kidney Disease <60  Kidney Failure <15    Magnesium [[id number]]  (Normal) Collected:  10/25/19 0619    Specimen:  Blood Updated:  10/25/19 0836     Magnesium 2.1 mg/dL     Troponin [[id number]]  (Normal) Collected:  10/25/19 0619    Specimen:  Blood Updated:  10/25/19 0836     Troponin T <0.010 ng/mL     Narrative:       Troponin T Reference Range:  <= 0.03 ng/mL-   Negative for AMI  >0.03 ng/mL-     Abnormal for myocardial necrosis.  Clinicians would have to utilize clinical acumen, EKG, Troponin and serial changes to determine if it is an Acute Myocardial Infarction or myocardial injury due to an underlying chronic condition.     CBC & Differential [[id number]] Collected:  10/25/19 0619    Specimen:  Blood Updated:  10/25/19 0757    Narrative:       The following orders were created for panel order CBC & Differential.  Procedure                               Abnormality         Status                      ---------                               -----------         ------                     CBC Auto Differential[[id number]]        Abnormal            Final result                 Please view results for these tests on the individual orders.    CBC Auto Differential [[id number]]  (Abnormal) Collected:  10/25/19 0619    Specimen:  Blood Updated:  10/25/19 0757     WBC 15.40 10*3/mm3      RBC 4.30 10*6/mm3      Hemoglobin 14.1 g/dL      Hematocrit 41.8 %      MCV 97.0 fL      MCH 32.6 pg      MCHC 33.7 g/dL      RDW 13.7 %      RDW-SD 46.4 fl      MPV 9.9 fL      Platelets 212 10*3/mm3      Neutrophil % 89.7 %      Lymphocyte % 7.1 %      Monocyte % 3.1 %      Eosinophil % 0.0 %      Basophil % 0.1 %      Neutrophils, Absolute 13.80 10*3/mm3      Lymphocytes, Absolute 1.10 10*3/mm3      Monocytes, Absolute 0.50 10*3/mm3      Eosinophils, Absolute 0.00 10*3/mm3      Basophils, Absolute 0.00 10*3/mm3      nRBC 0.1 /100 WBC     POC Glucose Once [[id number]]  (Abnormal) Collected:  10/25/19 0651    Specimen:  Blood Updated:  10/25/19 0652     Glucose 136 mg/dL      Comment: Serial Number: [id number]:  [id number]       POC Glucose Once [[id number]]  (Abnormal) Collected:  10/25/19 0606    Specimen:  Blood Updated:  10/25/19 0611     Glucose 132 mg/dL      Comment: Serial Number: [id number]:  [id number]       POC Glucose Once [[id number]]  (Abnormal) Collected:  10/25/19 0029    Specimen:  Blood Updated:  10/25/19 0030     Glucose 138 mg/dL      Comment: Serial Number: [id number]:  [id number]       Lactic Acid, Reflex [[id number]]  (Abnormal) Collected:  10/24/19 1854    Specimen:  Blood Updated:  10/24/19 1949     Lactate 3.8 mmol/L     Troponin [[id number]]  (Normal) Collected:  10/24/19 1854    Specimen:  Blood Updated:  10/24/19 1944     Troponin T <0.010 ng/mL     Narrative:       Troponin T Reference Range:  <= 0.03 ng/mL-   Negative for AMI  >0.03 ng/mL-     Abnormal for myocardial necrosis.   Clinicians would have to utilize clinical acumen, EKG, Troponin and serial changes to determine if it is an Acute Myocardial Infarction or myocardial injury due to an underlying chronic condition.     Lactic Acid, Reflex Timer (This will reflex a repeat order 3-3:15 hours after ordered.) [[id number]] Collected:  10/24/19 1347    Specimen:  Blood Updated:  10/24/19 1830     Extra Tube Hold for add-ons.     Comment: Auto resulted.       Troponin [[id number]]  (Normal) Collected:  10/24/19 1723    Specimen:  Blood Updated:  10/24/19 1801     Troponin T <0.010 ng/mL     Narrative:       Troponin T Reference Range:  <= 0.03 ng/mL-   Negative for AMI  >0.03 ng/mL-     Abnormal for myocardial necrosis.  Clinicians would have to utilize clinical acumen, EKG, Troponin and serial changes to determine if it is an Acute Myocardial Infarction or myocardial injury due to an underlying chronic condition.     Lactic Acid, Plasma [[id number]]  (Abnormal) Collected:  10/24/19 1347    Specimen:  Blood Updated:  10/24/19 1516     Lactate 3.3 mmol/L     Blood Culture - Blood, Hand, Left [[id number]] Collected:  10/24/19 1357    Specimen:  Blood from Hand, Left Updated:  10/24/19 1401    Blood Culture - Blood, Hand, Right [[id number]] Collected:  10/24/19 1347    Specimen:  Blood from Hand, Right Updated:  10/24/19 1401    MRSA Screen Culture - Swab, Nares [[id number]]  (Normal) Collected:  10/23/19 0111    Specimen:  Swab from Nares Updated:  10/24/19 1249     MRSA SCREEN CX No Methicillin Resistant Staphylococcus aureus isolated    POC Glucose Once [[id number]]  (Abnormal) Collected:  10/24/19 1207    Specimen:  Blood Updated:  10/24/19 1218     Glucose 137 mg/dL      Comment: Serial Number: [id number]:  [id number]             PENDING RESULTS: path from Dr. Fair    IMAGING REVIEWED:  Ct Chest With Contrast    Result Date: 10/24/2019  No acute findings are appreciated. No suspicious pulmonary nodules are identified. No enlarged  mediastinal or hilar lymph nodes are seen. Please see above comments for further detail.   Electronically Signed By-Dr. Derick Kiser MD On:10/24/2019 12:44 PM This report was finalized on [id number] by Dr. Derick Kiser MD.    Mri Brain With & Without Contrast    Result Date: 10/23/2019  This study is abnormal. There are enhancing abnormalities in the bilateral frontal lobes, which extend across the genu of the corpus callosum and expand the corpus callosum anteriorly. 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 Lymphoma cannot be excluded but is thought to be less likely. There is extensive surrounding vasogenic or tumor edema with associated mass effect. There is approximately 8 mm of left-to-right subfalcine herniation associated with the findings. No definite acute intracranial hemorrhage is seen. No definite acute infarct is identified. There is associated deformity of the lateral ventricles, greater on the left, especially involving the anterior horns of the lateral ventricles.  Electronically Signed By-Dr. Derick Kiser MD On:10/23/2019 2:05 PM This report was finalized on [id number] by Dr. Derick Kiser MD.    Ct Abdomen Pelvis With Contrast    Result Date: 10/24/2019  No acute findings are seen in the abdomen or pelvis by enhanced CT. No significant interval change is seen since the prior 10/25/2018 study.     Electronically Signed By-Dr. Derick Kiser MD On:10/24/2019 12:40 PM This report was finalized on [id number] by Dr. Derick Kiser MD.      I have reviewed the patient's labs, imaging, reports, and other clinician documentation.    Assessment/Plan   ASSESSMENT:  1. Frontal brain mass-On dexamethasone, Pepcid and Keppra.  Body CT's negative for maligancy. Concern for either a GBM or CNS lymphoma. 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 Palliative care on board.  2. Leukocytosis-due to  steroids.   3. H/O prostate cancer-diagnosed > 5 yr ago and has never recurred.  UTD on f/u.    4. H/O left neck skin cancer-planned to obtain path as melanoma skin cancers can frequently metastasize to the brain but on hold now that family choosing comfort only.   5. HTN/H/O CVA- ASA on hold for possible brain biopsy.      PLAN  1. Family interested in comfort measures only. Recommend Hospice evaluation.        Note updated by JANAY Bravo. Patient seen and examined by SKYLAR Rojo MD.  Electronically signed by ARABELLA Moore, 10/25/19, 8:41 AM.    .I have personally performed a face-to-face diagnostic evaluation on this patient.  I have discussed the case   with Renu Maciel NP, have edited/reviewed the note,  and agree with the care plan.  The patient claims to have no pain or headaches, and is oriented x1.  He is eating well and family is at bedside with plans to take him home with palliative care.        I discussed the patients findings and my recommendations with patient and family.

## 2019-10-25 NOTE — NURSING NOTE
Discharge instruction giving with verbal understanding noted wife and son at bedside , patient  Is alert at this time .

## 2019-10-25 NOTE — PROGRESS NOTES
Discharge Planning Assessment   Jose L     Patient Name: Bruce Manzo  MRN: [id number]  Today's Date: 10/25/2019    Admit Date: 10/22/2019       Plan  home [facility]    Patient/Family in Agreement with Plan  yes son in agreement            Patient Forms    Important Message from Medicare (IMM)  Delivered    Delivered to  Support person son in room and im letter given and he signed    Method of delivery  In person            Carol naegele rn  Case management  Office number [phone]  Cell phone [phone]

## 2019-10-25 NOTE — DISCHARGE SUMMARY
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          Hospital Course  Patient is a 84 y.o. male presented with short-term memory loss.  CT of the head obtained in the emergency room showed a mass in the frontal lobes with a shift.  Patient was admitted and neurosurgery consult was requested.  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  Patient was started on IV Decadron and Keppra.  CT of the chest abdomen and pelvis was negative for any metastatic disease.  Oncology consult was obtained.  Prognosis is very poor.  There were lengthy discussions between neurosurgery, oncology, and palliative care with the patient and his family.  They have decided to go home with the care of [facility].  They declined biopsy of brain mass.  Patient will be released today to hospice care and they can call our office for any concerns or orders.       Procedures Performed         Consults:   Consults     Date and Time Order Name Status Description    10/24/2019 1506 Inpatient Internal Medicine Consult      10/23/2019 1526 Hematology & Oncology Inpatient Consult      10/23/2019 0131 Inpatient Palliative Care MD Consult Completed     10/22/2019 2219 Inpatient Neurosurgery Consult Completed     10/22/2019 2010 Inpatient Neurology Consult Stroke Completed     10/22/2019 2010 Inpatient Neurology Consult Stroke Completed           Pertinent Test Results:   Lab Results (most recent)     Procedure Component Value  Units Date/Time    CBC & Differential [[id number]] Collected:  10/25/19 0619    Specimen:  Blood Updated:  10/25/19 0757    Narrative:       The following orders were created for panel order CBC & Differential.  Procedure                               Abnormality         Status                     ---------                               -----------         ------                     CBC Auto Differential[[id number]]        Abnormal            Final result                 Please view results for these tests on the individual orders.    CBC Auto Differential [[id number]]  (Abnormal) Collected:  10/25/19 0619    Specimen:  Blood Updated:  10/25/19 0757     WBC 15.40 10*3/mm3      RBC 4.30 10*6/mm3      Hemoglobin 14.1 g/dL      Hematocrit 41.8 %      MCV 97.0 fL      MCH 32.6 pg      MCHC 33.7 g/dL      RDW 13.7 %      RDW-SD 46.4 fl      MPV 9.9 fL      Platelets 212 10*3/mm3      Neutrophil % 89.7 %      Lymphocyte % 7.1 %      Monocyte % 3.1 %      Eosinophil % 0.0 %      Basophil % 0.1 %      Neutrophils, Absolute 13.80 10*3/mm3      Lymphocytes, Absolute 1.10 10*3/mm3      Monocytes, Absolute 0.50 10*3/mm3      Eosinophils, Absolute 0.00 10*3/mm3      Basophils, Absolute 0.00 10*3/mm3      nRBC 0.1 /100 WBC     Basic Metabolic Panel [[id number]] Collected:  10/25/19 0619    Specimen:  Blood Updated:  10/25/19 0752    Magnesium [[id number]] Collected:  10/25/19 0619    Specimen:  Blood Updated:  10/25/19 0752    Troponin [[id number]] Collected:  10/25/19 0619    Specimen:  Blood Updated:  10/25/19 0752    POC Glucose Once [[id number]]  (Abnormal) Collected:  10/25/19 0651    Specimen:  Blood Updated:  10/25/19 0652     Glucose 136 mg/dL      Comment: Serial Number: [id number]:  [id number]       POC Glucose Once [[id number]]  (Abnormal) Collected:  10/25/19 0606    Specimen:  Blood Updated:  10/25/19 0611     Glucose 132 mg/dL      Comment: Serial Number: [id number]:  [id number]       Lactic Acid, Reflex  [[id number]]  (Abnormal) Collected:  10/24/19 1854    Specimen:  Blood Updated:  10/24/19 1949     Lactate 3.8 mmol/L     Troponin [[id number]]  (Normal) Collected:  10/24/19 1854    Specimen:  Blood Updated:  10/24/19 1944     Troponin T <0.010 ng/mL     Narrative:       Troponin T Reference Range:  <= 0.03 ng/mL-   Negative for AMI  >0.03 ng/mL-     Abnormal for myocardial necrosis.  Clinicians would have to utilize clinical acumen, EKG, Troponin and serial changes to determine if it is an Acute Myocardial Infarction or myocardial injury due to an underlying chronic condition.     Lactic Acid, Reflex Timer (This will reflex a repeat order 3-3:15 hours after ordered.) [[id number]] Collected:  10/24/19 1347    Specimen:  Blood Updated:  10/24/19 1830     Extra Tube Hold for add-ons.     Comment: Auto resulted.       Lactic Acid, Plasma [[id number]]  (Abnormal) Collected:  10/24/19 1347    Specimen:  Blood Updated:  10/24/19 1516     Lactate 3.3 mmol/L     Blood Culture - Blood, Hand, Left [[id number]] Collected:  10/24/19 1357    Specimen:  Blood from Hand, Left Updated:  10/24/19 1401    Blood Culture - Blood, Hand, Right [[id number]] Collected:  10/24/19 1347    Specimen:  Blood from Hand, Right Updated:  10/24/19 1401    MRSA Screen Culture - Swab, Nares [[id number]]  (Normal) Collected:  10/23/19 0111    Specimen:  Swab from Nares Updated:  10/24/19 1249     MRSA SCREEN CX No Methicillin Resistant Staphylococcus aureus isolated    Basic Metabolic Panel [[id number]]  (Abnormal) Collected:  10/24/19 0404    Specimen:  Blood Updated:  10/24/19 0730     Glucose 159 mg/dL      BUN 19 mg/dL      Creatinine 0.72 mg/dL      Sodium 141 mmol/L      Potassium 3.8 mmol/L      Chloride 104 mmol/L      CO2 24.0 mmol/L      Calcium 9.4 mg/dL      eGFR Non African Amer 104 mL/min/1.73      BUN/Creatinine Ratio 26.4     Anion Gap 13.0 mmol/L     Narrative:       GFR Normal >60  Chronic Kidney Disease <60  Kidney Failure <15    Magnesium  [[id number]]  (Normal) Collected:  10/24/19 0404    Specimen:  Blood Updated:  10/24/19 0721     Magnesium 1.9 mg/dL     CBC & Differential [[id number]] Collected:  10/24/19 0404    Specimen:  Blood Updated:  10/24/19 0547    Narrative:       The following orders were created for panel order CBC & Differential.  Procedure                               Abnormality         Status                     ---------                               -----------         ------                     CBC Auto Differential[[id number]]        Abnormal            Final result                 Please view results for these tests on the individual orders.    CBC Auto Differential [[id number]]  (Abnormal) Collected:  10/24/19 0404    Specimen:  Blood Updated:  10/24/19 0547     WBC 14.20 10*3/mm3      Comment: Result checked         RBC 4.08 10*6/mm3      Hemoglobin 13.3 g/dL      Hematocrit 39.5 %      MCV 96.9 fL      MCH 32.7 pg      MCHC 33.8 g/dL      RDW 13.7 %      RDW-SD 46.4 fl      MPV 9.5 fL      Platelets 210 10*3/mm3      Neutrophil % 90.1 %      Lymphocyte % 7.3 %      Monocyte % 2.6 %      Eosinophil % 0.0 %      Basophil % 0.0 %      Neutrophils, Absolute 12.80 10*3/mm3      Lymphocytes, Absolute 1.00 10*3/mm3      Monocytes, Absolute 0.40 10*3/mm3      Eosinophils, Absolute 0.00 10*3/mm3      Basophils, Absolute 0.00 10*3/mm3      nRBC 0.0 /100 WBC     Urinalysis With Culture If Indicated - Urine, Random Void [[id number]]  (Abnormal) Collected:  10/23/19 1812    Specimen:  Urine, Random Void Updated:  10/23/19 1829     Color, UA Yellow     Appearance, UA Clear     pH, UA 6.0     Specific Gravity, UA 1.029     Glucose, UA Negative     Ketones, UA Trace     Bilirubin, UA Negative     Blood, UA Negative     Protein, UA Negative     Leuk Esterase, UA Negative     Nitrite, UA Negative     Urobilinogen, UA 0.2 E.U./dL    Narrative:       Urine microscopic not indicated.    Vitamin B12 [[id number]]  (Abnormal) Collected:  10/23/19 1127     Specimen:  Blood Updated:  10/23/19 1633     Vitamin B-12 180 pg/mL     Hemoglobin A1c [[id number]]  (Normal) Collected:  10/23/19 0403    Specimen:  Blood Updated:  10/23/19 1059     Hemoglobin A1C 5.3 %     Narrative:       Hemoglobin A1C Reference Range:    <5.7 %        Normal  5.7-6.4 %     Increased risk for diabetes  > 6.4 %        Diabetes       These guidelines have been recommended by the American Diabetic Association for Hgb A1c.      The following 2010 guidelines have been recommended by the American Diabetes Association for Hemoglobin A1c.    HBA1c 5.7-6.4% Increased risk for future diabetes (pre-diabetes)  HBA1c     >6.4% Diabetes    TSH [[id number]]  (Normal) Collected:  10/22/19 2055    Specimen:  Blood from Arm, Right Updated:  10/23/19 1037     TSH 3.120 uIU/mL     Lipid Panel [[id number]]  (Abnormal) Collected:  10/22/19 2055    Specimen:  Blood from Arm, Right Updated:  10/23/19 1030     Total Cholesterol 81 mg/dL      Triglycerides 158 mg/dL      HDL Cholesterol 24 mg/dL      LDL Cholesterol  25 mg/dL      VLDL Cholesterol 31.6 mg/dL      LDL/HDL Ratio 1.06    Narrative:       Cholesterol Reference Ranges  (U.S. Department of Health and Human Services ATP III Classifications)    Desirable          <200 mg/dL  Borderline High    200-239 mg/dL  High Risk          >240 mg/dL      Triglyceride Reference Ranges  (U.S. Department of Health and Human Services ATP III Classifications)    Normal           <150 mg/dL  Borderline High  150-199 mg/dL  High             200-499 mg/dL  Very High        >500 mg/dL    HDL Reference Ranges  (U.S. Department of Health and Human Services ATP III Classifcations)    Low     <40 mg/dl (major risk factor for CHD)  High    >60 mg/dl ('negative' risk factor for CHD)        LDL Reference Ranges  (U.S. Department of Health and Human Services ATP III Classifcations)    Optimal          <100 mg/dL  Near Optimal     100-129 mg/dL  Borderline High  130-159 mg/dL  High              160-189 mg/dL  Very High        >189 mg/dL    [place] Draw [[id number]] Collected:  10/22/19 2013    Specimen:  Blood Updated:  10/22/19 2213    Narrative:       The following orders were created for panel order [place] Draw.  Procedure                               Abnormality         Status                     ---------                               -----------         ------                     Light Blue Top[[id number]]                                   Final result               Green Top (Gel)[[id number]]                                  Final result               Lavender Top[[id number]]                                     Final result               Gold Top - SST[[id number]]                                   Final result                 Please view results for these tests on the individual orders.    Light Blue Top [[id number]] Collected:  10/22/19 2013    Specimen:  Blood from Arm, Left Updated:  10/22/19 2213     Extra Tube hold for add-on     Comment: Auto resulted       Green Top (Gel) [[id number]] Collected:  10/22/19 2013    Specimen:  Blood from Arm, Left Updated:  10/22/19 2213     Extra Tube Hold for add-ons.     Comment: Auto resulted.       Lavender Top [[id number]] Collected:  10/22/19 2013    Specimen:  Blood from Arm, Left Updated:  10/22/19 2213     Extra Tube hold for add-on     Comment: Auto resulted       Gold Top - SST [[id number]] Collected:  10/22/19 2013    Specimen:  Blood from Arm, Left Updated:  10/22/19 2213     Extra Tube Hold for add-ons.     Comment: Auto resulted.       Comprehensive Metabolic Panel [[id number]]  (Abnormal) Collected:  10/22/19 2055    Specimen:  Blood from Arm, Right Updated:  10/22/19 2121     Glucose 155 mg/dL      BUN 15 mg/dL      Creatinine 0.72 mg/dL      Sodium 145 mmol/L      Potassium 4.0 mmol/L      Chloride 107 mmol/L      CO2 27.0 mmol/L      Calcium 8.7 mg/dL      Total Protein 6.2 g/dL      Albumin 4.10 g/dL      ALT (SGPT) 17 U/L      AST (SGOT) 17 U/L       Alkaline Phosphatase 58 U/L      Total Bilirubin 0.4 mg/dL      eGFR Non African Amer 104 mL/min/1.73      Globulin 2.1 gm/dL      A/G Ratio 2.0 g/dL      BUN/Creatinine Ratio 20.8     Anion Gap 11.0 mmol/L     Narrative:       GFR Normal >60  Chronic Kidney Disease <60  Kidney Failure <15    Protime-INR [[id number]]  (Normal) Collected:  10/22/19 2013    Specimen:  Blood from Arm, Left Updated:  10/22/19 2046     Protime 10.6 Seconds      INR 1.02    aPTT [[id number]]  (Abnormal) Collected:  10/22/19 2013    Specimen:  Blood from Arm, Left Updated:  10/22/19 2046     PTT 20.9 seconds            Condition on Discharge:  Fair    Vital Signs  Temp:  [97.4 °F (36.3 °C)-97.9 °F (36.6 °C)] 97.6 °F (36.4 °C)  Heart Rate:  [62-84] 63  Resp:  [18-26] 20  BP: (110-154)/(50-76) 130/72    Physical Exam   Constitutional: He is oriented to person, place, and time. He appears well-developed and well-nourished.   Cardiovascular: Normal rate, regular rhythm and normal heart sounds.   Pulmonary/Chest: Effort normal and breath sounds normal.   Abdominal: Soft. Bowel sounds are normal.   Neurological: He is alert and oriented to person, place, and time.   Skin: Skin is warm and dry.   Psychiatric: He has a normal mood and affect. His behavior is normal. Judgment and thought content normal.           Discharge Disposition  Home-Health Care [facility]    Discharge Medications     Discharge Medications      New Medications      Instructions Start Date   dexamethasone 4 MG tablet  Commonly known as:  DECADRON   4 mg, Oral, 2 Times Daily With Meals      levETIRAcetam 500 MG tablet  Commonly known as:  KEPPRA   500 mg, Oral, 2 Times Daily         Stop These Medications    ASPIR-LOW 81 MG EC tablet  Generic drug:  aspirin     atenolol 25 MG tablet  Commonly known as:  TENORMIN     pravastatin 20 MG tablet  Commonly known as:  PRAVACHOL     sertraline 25 MG tablet  Commonly known as:  ZOLOFT            Discharge Diet:   Diet Instructions     Diet:  Regular      Discharge Diet:  Regular          Activity at Discharge:   Activity Instructions     Activity as Tolerated      Gradually Increase Activity Until at Pre-Hospitalization Level            Follow-up Appointments  Future Appointments   Date Time Provider Department Center   10/30/2019  2:10 PM Nano Narvaez MD [facility] None   1/20/2020 11:20 AM Glendy Melgar MD [facility]     Additional Instructions for the Follow-ups that You Need to Schedule     Call MD With Problems / Concerns   As directed      Instructions: Call for any concerns.    Order Comments:  Instructions: Call for any concerns.          Discharge Follow-up with PCP   As directed       Currently Documented PCP:    Rohit Goodman MD    PCP Phone Number:    [phone]     Follow Up Details:  in 1 week               Test Results Pending at Discharge   Order Current Status    Basic Metabolic Panel In process    Blood Culture - Blood, Hand, Left In process    Blood Culture - Blood, Hand, Right In process    Magnesium In process    Troponin In process           KARRI Patten  10/25/19  8:21 AM

## 2019-10-25 NOTE — PROGRESS NOTES
I had a lengthy discussion with the wife and family.  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  We did discuss biopsy but even if this was CNS lymphoma which I do not feel it is his prognosis would still be very poor.  The family has elected that they would prefer not to have anything but comfort measures.  We did discuss that if this was high-grade glioma and nothing being performed and only comfort measures at most likely he is looking at less than a month or 2.  Even with the most aggressive intervention still 4 to 6 months.  This is a nonoperable lesion extending to form to the contralateral hemisphere.  They did have a lengthy discussion with Dr. Rojo and at this time the team is in consensus agreement with no intervention.

## 2019-10-25 NOTE — PLAN OF CARE
Problem: Patient Care Overview  Goal: Plan of Care Review  Outcome: Ongoing (interventions implemented as appropriate)   10/25/19 0341   Coping/Psychosocial   Plan of Care Reviewed With patient   Plan of Care Review   Progress improving   OTHER   Outcome Summary pt stable, no complaints.     Goal: Individualization and Mutuality  Outcome: Ongoing (interventions implemented as appropriate)    Goal: Discharge Needs Assessment  Outcome: Ongoing (interventions implemented as appropriate)    Goal: Interprofessional Rounds/Family Conf  Outcome: Ongoing (interventions implemented as appropriate)

## 2019-10-26 NOTE — OUTREACH NOTE
Prep Survey      Responses   Facility patient discharged from?  [facility]   Is patient eligible?  No   What are the reasons patient is not eligible?  Hospice/Pallative Care [Glioblastoma]   Does the patient have one of the following disease processes/diagnoses(primary or secondary)?  Other   Prep survey completed?  Yes          Birdie Barrett RN

## 2019-10-28 NOTE — PROGRESS NOTES
Discharge Planning Assessment   [facility]     Patient Name: Bruce Manzo  MRN: [id number]  Today's Date: 10/28/2019    Admit Date: 10/22/2019          Plan    Final Discharge Disposition Code  50 - home with hospice    Final Note  home with [facility]          Carol naegele rn  Case management  Office number [phone]  Cell phone [phone]